# Patient Record
Sex: FEMALE | Race: WHITE | NOT HISPANIC OR LATINO | Employment: UNEMPLOYED | URBAN - METROPOLITAN AREA
[De-identification: names, ages, dates, MRNs, and addresses within clinical notes are randomized per-mention and may not be internally consistent; named-entity substitution may affect disease eponyms.]

---

## 2020-01-21 ENCOUNTER — APPOINTMENT (OUTPATIENT)
Dept: GENERAL RADIOLOGY | Facility: HOSPITAL | Age: 45
End: 2020-01-21

## 2020-01-21 ENCOUNTER — APPOINTMENT (OUTPATIENT)
Dept: CT IMAGING | Facility: HOSPITAL | Age: 45
End: 2020-01-21

## 2020-01-21 ENCOUNTER — HOSPITAL ENCOUNTER (EMERGENCY)
Facility: HOSPITAL | Age: 45
Discharge: HOME | End: 2020-01-21
Attending: EMERGENCY MEDICINE | Admitting: EMERGENCY MEDICINE

## 2020-01-21 VITALS
DIASTOLIC BLOOD PRESSURE: 66 MMHG | OXYGEN SATURATION: 97 % | WEIGHT: 142 LBS | RESPIRATION RATE: 16 BRPM | HEART RATE: 75 BPM | TEMPERATURE: 99.1 F | SYSTOLIC BLOOD PRESSURE: 122 MMHG | HEIGHT: 64 IN | BODY MASS INDEX: 24.24 KG/M2

## 2020-01-21 DIAGNOSIS — R11.2 NAUSEA AND VOMITING, INTRACTABILITY OF VOMITING NOT SPECIFIED, UNSPECIFIED VOMITING TYPE: ICD-10-CM

## 2020-01-21 DIAGNOSIS — F10.930 ALCOHOL WITHDRAWAL SYNDROME WITHOUT COMPLICATION (HCC): Primary | ICD-10-CM

## 2020-01-21 LAB
ALBUMIN SERPL-MCNC: 4.5 G/DL (ref 3.5–5.2)
ALBUMIN/GLOB SERPL: 1.4 G/DL
ALP SERPL-CCNC: 71 U/L (ref 39–117)
ALT SERPL W P-5'-P-CCNC: 12 U/L (ref 1–33)
ANION GAP SERPL CALCULATED.3IONS-SCNC: 13 MMOL/L (ref 5–15)
AST SERPL-CCNC: 16 U/L (ref 1–32)
BASOPHILS # BLD AUTO: 0.06 10*3/MM3 (ref 0–0.2)
BASOPHILS NFR BLD AUTO: 0.5 % (ref 0–1.5)
BILIRUB SERPL-MCNC: 0.7 MG/DL (ref 0.2–1.2)
BUN BLD-MCNC: 14 MG/DL (ref 6–20)
BUN/CREAT SERPL: 17.1 (ref 7–25)
CALCIUM SPEC-SCNC: 9.2 MG/DL (ref 8.6–10.5)
CHLORIDE SERPL-SCNC: 100 MMOL/L (ref 98–107)
CO2 SERPL-SCNC: 25 MMOL/L (ref 22–29)
CREAT BLD-MCNC: 0.82 MG/DL (ref 0.57–1)
DEPRECATED RDW RBC AUTO: 49.1 FL (ref 37–54)
EOSINOPHIL # BLD AUTO: 0.34 10*3/MM3 (ref 0–0.4)
EOSINOPHIL NFR BLD AUTO: 3 % (ref 0.3–6.2)
ERYTHROCYTE [DISTWIDTH] IN BLOOD BY AUTOMATED COUNT: 14.1 % (ref 12.3–15.4)
GFR SERPL CREATININE-BSD FRML MDRD: 75 ML/MIN/1.73
GLOBULIN UR ELPH-MCNC: 3.2 GM/DL
GLUCOSE BLD-MCNC: 86 MG/DL (ref 65–99)
HCT VFR BLD AUTO: 43.1 % (ref 34–46.6)
HGB BLD-MCNC: 14.1 G/DL (ref 12–15.9)
HOLD SPECIMEN: NORMAL
HOLD SPECIMEN: NORMAL
IMM GRANULOCYTES # BLD AUTO: 0.04 10*3/MM3 (ref 0–0.05)
IMM GRANULOCYTES NFR BLD AUTO: 0.3 % (ref 0–0.5)
LIPASE SERPL-CCNC: 20 U/L (ref 13–60)
LYMPHOCYTES # BLD AUTO: 2.51 10*3/MM3 (ref 0.7–3.1)
LYMPHOCYTES NFR BLD AUTO: 21.8 % (ref 19.6–45.3)
MCH RBC QN AUTO: 31.1 PG (ref 26.6–33)
MCHC RBC AUTO-ENTMCNC: 32.7 G/DL (ref 31.5–35.7)
MCV RBC AUTO: 95.1 FL (ref 79–97)
MONOCYTES # BLD AUTO: 0.96 10*3/MM3 (ref 0.1–0.9)
MONOCYTES NFR BLD AUTO: 8.4 % (ref 5–12)
NEUTROPHILS # BLD AUTO: 7.58 10*3/MM3 (ref 1.7–7)
NEUTROPHILS NFR BLD AUTO: 66 % (ref 42.7–76)
NRBC BLD AUTO-RTO: 0 /100 WBC (ref 0–0.2)
NT-PROBNP SERPL-MCNC: 225.5 PG/ML (ref 5–450)
PLATELET # BLD AUTO: 354 10*3/MM3 (ref 140–450)
PMV BLD AUTO: 11.3 FL (ref 6–12)
POTASSIUM BLD-SCNC: 3.7 MMOL/L (ref 3.5–5.2)
PROT SERPL-MCNC: 7.7 G/DL (ref 6–8.5)
RBC # BLD AUTO: 4.53 10*6/MM3 (ref 3.77–5.28)
SODIUM BLD-SCNC: 138 MMOL/L (ref 136–145)
TROPONIN T SERPL-MCNC: <0.01 NG/ML (ref 0–0.03)
WBC NRBC COR # BLD: 11.49 10*3/MM3 (ref 3.4–10.8)
WHOLE BLOOD HOLD SPECIMEN: NORMAL
WHOLE BLOOD HOLD SPECIMEN: NORMAL

## 2020-01-21 PROCEDURE — 96375 TX/PRO/DX INJ NEW DRUG ADDON: CPT

## 2020-01-21 PROCEDURE — 99284 EMERGENCY DEPT VISIT MOD MDM: CPT

## 2020-01-21 PROCEDURE — 25010000002 DIPHENHYDRAMINE PER 50 MG: Performed by: EMERGENCY MEDICINE

## 2020-01-21 PROCEDURE — 83880 ASSAY OF NATRIURETIC PEPTIDE: CPT | Performed by: EMERGENCY MEDICINE

## 2020-01-21 PROCEDURE — 25010000002 LORAZEPAM PER 2 MG: Performed by: EMERGENCY MEDICINE

## 2020-01-21 PROCEDURE — 83690 ASSAY OF LIPASE: CPT | Performed by: EMERGENCY MEDICINE

## 2020-01-21 PROCEDURE — 96365 THER/PROPH/DIAG IV INF INIT: CPT

## 2020-01-21 PROCEDURE — 70450 CT HEAD/BRAIN W/O DYE: CPT

## 2020-01-21 PROCEDURE — 96366 THER/PROPH/DIAG IV INF ADDON: CPT

## 2020-01-21 PROCEDURE — 93005 ELECTROCARDIOGRAM TRACING: CPT | Performed by: EMERGENCY MEDICINE

## 2020-01-21 PROCEDURE — 85025 COMPLETE CBC W/AUTO DIFF WBC: CPT | Performed by: EMERGENCY MEDICINE

## 2020-01-21 PROCEDURE — 25010000002 THIAMINE PER 100 MG: Performed by: PHYSICIAN ASSISTANT

## 2020-01-21 PROCEDURE — 84484 ASSAY OF TROPONIN QUANT: CPT | Performed by: EMERGENCY MEDICINE

## 2020-01-21 PROCEDURE — 25010000002 METOCLOPRAMIDE PER 10 MG: Performed by: EMERGENCY MEDICINE

## 2020-01-21 PROCEDURE — 71045 X-RAY EXAM CHEST 1 VIEW: CPT

## 2020-01-21 PROCEDURE — 25010000002 MAGNESIUM SULFATE PER 500 MG OF MAGNESIUM: Performed by: PHYSICIAN ASSISTANT

## 2020-01-21 PROCEDURE — 80053 COMPREHEN METABOLIC PANEL: CPT | Performed by: EMERGENCY MEDICINE

## 2020-01-21 RX ORDER — CLONIDINE HYDROCHLORIDE 0.1 MG/1
0.1 TABLET ORAL 3 TIMES DAILY
COMMUNITY
End: 2020-02-11 | Stop reason: SDUPTHER

## 2020-01-21 RX ORDER — SUCRALFATE 1 G/1
1 TABLET ORAL 4 TIMES DAILY
COMMUNITY

## 2020-01-21 RX ORDER — DIPHENHYDRAMINE HYDROCHLORIDE 50 MG/ML
25 INJECTION INTRAMUSCULAR; INTRAVENOUS ONCE
Status: COMPLETED | OUTPATIENT
Start: 2020-01-21 | End: 2020-01-21

## 2020-01-21 RX ORDER — LORAZEPAM 2 MG/ML
1 INJECTION INTRAMUSCULAR ONCE
Status: COMPLETED | OUTPATIENT
Start: 2020-01-21 | End: 2020-01-21

## 2020-01-21 RX ORDER — ONDANSETRON 4 MG/1
4 TABLET, ORALLY DISINTEGRATING ORAL EVERY 8 HOURS PRN
Qty: 12 TABLET | Refills: 0 | Status: SHIPPED | OUTPATIENT
Start: 2020-01-21

## 2020-01-21 RX ORDER — METOCLOPRAMIDE HYDROCHLORIDE 5 MG/ML
10 INJECTION INTRAMUSCULAR; INTRAVENOUS ONCE
Status: COMPLETED | OUTPATIENT
Start: 2020-01-21 | End: 2020-01-21

## 2020-01-21 RX ORDER — SODIUM CHLORIDE 0.9 % (FLUSH) 0.9 %
10 SYRINGE (ML) INJECTION AS NEEDED
Status: DISCONTINUED | OUTPATIENT
Start: 2020-01-21 | End: 2020-01-21 | Stop reason: HOSPADM

## 2020-01-21 RX ORDER — QUETIAPINE FUMARATE 100 MG/1
100 TABLET, FILM COATED ORAL NIGHTLY
COMMUNITY

## 2020-01-21 RX ORDER — RANITIDINE 150 MG/1
150 TABLET ORAL 2 TIMES DAILY
COMMUNITY

## 2020-01-21 RX ORDER — ASPIRIN 81 MG/1
324 TABLET, CHEWABLE ORAL ONCE
Status: DISCONTINUED | OUTPATIENT
Start: 2020-01-21 | End: 2020-01-21

## 2020-01-21 RX ADMIN — METOCLOPRAMIDE 10 MG: 5 INJECTION, SOLUTION INTRAMUSCULAR; INTRAVENOUS at 15:54

## 2020-01-21 RX ADMIN — THIAMINE HYDROCHLORIDE 1000 ML/HR: 100 INJECTION, SOLUTION INTRAMUSCULAR; INTRAVENOUS at 16:30

## 2020-01-21 RX ADMIN — LORAZEPAM 1 MG: 2 INJECTION INTRAMUSCULAR; INTRAVENOUS at 15:54

## 2020-01-21 RX ADMIN — DIPHENHYDRAMINE HYDROCHLORIDE 25 MG: 50 INJECTION INTRAMUSCULAR; INTRAVENOUS at 15:54

## 2020-01-21 RX ADMIN — SODIUM CHLORIDE 2000 ML: 9 INJECTION, SOLUTION INTRAVENOUS at 15:53

## 2020-02-10 NOTE — PROGRESS NOTES
"[facility]  Heart and Valve Center      02/11/2020       Jonna Lazar  [address] [facility] [address]  [unfilled]    1975    Yamileth Toledo APRN    Jonna Lazar is a 44 y.o. female.      Subjective:     Chief Complaint:  Hypertension and Establish Care       HPI   Patient is a 44-year-old female with past medical history significant for anxiety, depression, alcohol abuse and hypertension who presents to the heart and valve Center for evaluation of uncontrolled hypertension at the request of LEENA Martini.  Patient sent to ED 1/21 with uncontrolled hypertension and alcohol withdrawal.  Patient had associated nausea, vomiting, chest pain, shortness of breath, left upper extremity weakness, visual disturbances and headaches.  She reports that she normally drinks a pint of vodka daily but had not in the past 5 days.  Work-up in ED was unremarkable.  She reports episodes of \"blacking out\" for a while now but recently has been worsening.  She describes episodes of losing control of her entire body, body shaking and then blacking out.  She recently suffered from a broken rib because of this.  She tells me that over the past week she is cut down her drinking and now only drinks a couple drinks a week.  She notes her left arm going completely limp at times and then associated with her whole body convulsing.  She also notes that her left lower extremity will also go limp at times.  She reports a history of questionable seizures but says she is never seen a neurologist.  She also reports a separate issue of just blacking out and falling to the ground.  She states that this happens when she is sober.  She reports that her blood pressure at home is around 160-170s. She says she has been on clonidine PRN for at least a year and lisinopril also. She says lisinopril/hctz was recently increased.  She was on prazosin but says she ran out of refills on this.  She walks regularly and denies " any exertional shortness of breath or chest pain.     Cardiac risks: HTN, tobacco abuse  Patient Active Problem List   Diagnosis   • Essential hypertension       Past Medical History:   Diagnosis Date   • Migraine    • Seizures (CMS/HCC)        Past Surgical History:   Procedure Laterality Date   • HYSTERECTOMY     • TUBAL ABDOMINAL LIGATION         Family History   Problem Relation Age of Onset   • Parkinsonism Mother    • No Known Problems Father    • No Known Problems Brother    • No Known Problems Maternal Grandmother    • Cancer Maternal Grandfather    • No Known Problems Paternal Grandmother    • No Known Problems Paternal Grandfather    • Cancer Brother         remission       Social History     Socioeconomic History   • Marital status: Single     Spouse name: Not on file   • Number of children: Not on file   • Years of education: Not on file   • Highest education level: Not on file   Tobacco Use   • Smoking status: Current Every Day Smoker     Packs/day: 0.50     Types: Cigarettes   • Smokeless tobacco: Never Used   Substance and Sexual Activity   • Alcohol use: Yes     Frequency: 2-3 times a week     Drinks per session: 5 or 6     Comment: last drank 5 days ago  usually liquor    • Drug use: Never   • Sexual activity: Defer   Social History Narrative    Caffeine: 1 soda daily       No Known Allergies      Current Outpatient Medications:   •  cloNIDine (CATAPRES) 0.1 MG tablet, Take 0.1 mg by mouth 3 (Three) Times a Day., Disp: , Rfl:   •  lisinopril-hydrochlorothiazide (PRINZIDE,ZESTORETIC) 20-12.5 MG per tablet, Take 1 tablet by mouth Daily., Disp: , Rfl:   •  ondansetron ODT (ZOFRAN-ODT) 4 MG disintegrating tablet, Take 1 tablet by mouth Every 8 (Eight) Hours As Needed for Nausea., Disp: 12 tablet, Rfl: 0  •  QUEtiapine (SEROquel) 100 MG tablet, Take 100 mg by mouth Every Night., Disp: , Rfl:   •  raNITIdine (ZANTAC) 150 MG tablet, Take 150 mg by mouth 2 (Two) Times a Day., Disp: , Rfl:   •  sucralfate  (CARAFATE) 1 g tablet, Take 1 g by mouth 4 (Four) Times a Day., Disp: , Rfl:     The following portions of the patient's history were reviewed today and updated as appropriate: allergies, current medications, past family history, past medical history, past social history, past surgical history and problem list     Review of Systems   Constitution: Positive for malaise/fatigue. Negative for chills and fever.   Eyes: Negative.    Cardiovascular: Positive for near-syncope and syncope. Negative for chest pain, claudication, cyanosis, dyspnea on exertion, irregular heartbeat, leg swelling, orthopnea, palpitations and paroxysmal nocturnal dyspnea.   Respiratory: Positive for cough. Negative for shortness of breath and snoring.    Endocrine: Negative.    Hematologic/Lymphatic: Does not bruise/bleed easily.   Skin: Negative for poor wound healing.   Musculoskeletal: Positive for falls, muscle cramps and muscle weakness.   Gastrointestinal: Positive for nausea and vomiting. Negative for abdominal pain, heartburn, hematemesis and melena.   Genitourinary: Negative.  Negative for hematuria.   Neurological: Positive for excessive daytime sleepiness, dizziness, headaches, light-headedness, loss of balance, numbness, paresthesias and weakness.   Psychiatric/Behavioral: Negative.    Allergic/Immunologic: Negative.        Objective:     Vitals:    02/11/20 1332 02/11/20 1333 02/11/20 1334   BP: 129/72 125/67 122/68   BP Location: Left arm Left arm Right arm   Patient Position: Standing Sitting Sitting   Cuff Size: Adult Adult Adult   Pulse: 90 77 81   Temp:   98.4 °F (36.9 °C)   TempSrc:   Temporal   SpO2: 99% 97% 97%   Weight:   67.2 kg (148 lb 4 oz)       Body mass index is 25.45 kg/m².    Physical Exam   Constitutional: She is oriented to person, place, and time. She appears well-developed and well-nourished. No distress.   HENT:   Head: Normocephalic.   Eyes: Pupils are equal, round, and reactive to light. Conjunctivae are  normal.   Neck: Neck supple. No JVD present. No thyromegaly present.   Cardiovascular: Normal rate, regular rhythm, normal heart sounds and intact distal pulses. Exam reveals no gallop and no friction rub.   No murmur heard.  Pulmonary/Chest: Effort normal and breath sounds normal. No respiratory distress. She has no wheezes. She has no rales. She exhibits no tenderness.   Abdominal: Soft. Bowel sounds are normal.   Musculoskeletal: Normal range of motion. She exhibits no edema.   Neurological: She is alert and oriented to person, place, and time.   Skin: Skin is warm and dry.   Psychiatric: She has a normal mood and affect. Her behavior is normal. Thought content normal.   Vitals reviewed.      Lab and Diagnostic Review:  Results for orders placed or performed during the hospital encounter of 01/21/20   Troponin   Result Value Ref Range    Troponin T <0.010 0.000 - 0.030 ng/mL   Comprehensive Metabolic Panel   Result Value Ref Range    Glucose 86 65 - 99 mg/dL    BUN 14 6 - 20 mg/dL    Creatinine 0.82 0.57 - 1.00 mg/dL    Sodium 138 136 - 145 mmol/L    Potassium 3.7 3.5 - 5.2 mmol/L    Chloride 100 98 - 107 mmol/L    CO2 25.0 22.0 - 29.0 mmol/L    Calcium 9.2 8.6 - 10.5 mg/dL    Total Protein 7.7 6.0 - 8.5 g/dL    Albumin 4.50 3.50 - 5.20 g/dL    ALT (SGPT) 12 1 - 33 U/L    AST (SGOT) 16 1 - 32 U/L    Alkaline Phosphatase 71 39 - 117 U/L    Total Bilirubin 0.7 0.2 - 1.2 mg/dL    eGFR Non African Amer 75 >60 mL/min/1.73    Globulin 3.2 gm/dL    A/G Ratio 1.4 g/dL    BUN/Creatinine Ratio 17.1 7.0 - 25.0    Anion Gap 13.0 5.0 - 15.0 mmol/L   Lipase   Result Value Ref Range    Lipase 20 13 - 60 U/L   BNP   Result Value Ref Range    proBNP 225.5 5.0 - 450.0 pg/mL   CBC Auto Differential   Result Value Ref Range    WBC 11.49 (H) 3.40 - 10.80 10*3/mm3    RBC 4.53 3.77 - 5.28 10*6/mm3    Hemoglobin 14.1 12.0 - 15.9 g/dL    Hematocrit 43.1 34.0 - 46.6 %    MCV 95.1 79.0 - 97.0 fL    MCH 31.1 26.6 - 33.0 pg    MCHC 32.7 31.5  - 35.7 g/dL    RDW 14.1 12.3 - 15.4 %    RDW-SD 49.1 37.0 - 54.0 fl    MPV 11.3 6.0 - 12.0 fL    Platelets 354 140 - 450 10*3/mm3    Neutrophil % 66.0 42.7 - 76.0 %    Lymphocyte % 21.8 19.6 - 45.3 %    Monocyte % 8.4 5.0 - 12.0 %    Eosinophil % 3.0 0.3 - 6.2 %    Basophil % 0.5 0.0 - 1.5 %    Immature Grans % 0.3 0.0 - 0.5 %    Neutrophils, Absolute 7.58 (H) 1.70 - 7.00 10*3/mm3    Lymphocytes, Absolute 2.51 0.70 - 3.10 10*3/mm3    Monocytes, Absolute 0.96 (H) 0.10 - 0.90 10*3/mm3    Eosinophils, Absolute 0.34 0.00 - 0.40 10*3/mm3    Basophils, Absolute 0.06 0.00 - 0.20 10*3/mm3    Immature Grans, Absolute 0.04 0.00 - 0.05 10*3/mm3    nRBC 0.0 0.0 - 0.2 /100 WBC     EKG 1/21/20 Normal sinus rhythm  Nonspecific ST and T wave abnormality  Abnormal ECG    Assessment and Plan:   1. Essential hypertension  Appears controlled today. I would suspect this is because she has decreased her ETOH intake and she tells me she is now taking clonidine TID regularly. Discussed difficulty controlling BPs with excessive amount of ETOH and encouraged to quit. Offered 24 hour blood pressure monitor vs home monitoring and she says she will start monitoring regularly at home. Call if SBP consistently greater than 140. Will add amlodipine so she can wean off clonidine and use again PRN. Advised her to stop afternoon dose for about a week then try and wean off morning dose and evening dose later and then use PRN SBP>160   - Mobile Cardiac Outpatient Telemetry; Future  - Adult Transthoracic Echo Complete W/ Cont if Necessary to rule out structural heart disease/alcoholic cardiomyopathy    2. Syncope and collapse  Symptoms sound more neurological. She will discuss neurology referral with Dr. Toledo at her follow up next week. I am inclined to think that episodes of LOC are related to ETOH abuse but she is very adamant that she is sober during the time. Will do event monitor to rule out arrhythmia  - Mobile Cardiac Outpatient Telemetry;  Future  - Adult Transthoracic Echo Complete W/ Cont if Necessary Per Protocol; Future      RV 6 weeks    It has been a pleasure to participate in the care of this patient.  Patient was instructed to call the Heart and Valve Center with any questions, concerns, or worsening symptoms.    *Please note that portions of this note were completed with a voice recognition program. Efforts were made to edit the dictations, but occasionally words are mistranscribed.

## 2020-02-11 ENCOUNTER — HOSPITAL ENCOUNTER (OUTPATIENT)
Dept: CARDIOLOGY | Facility: HOSPITAL | Age: 45
Discharge: HOME | End: 2020-02-11

## 2020-02-11 ENCOUNTER — OFFICE VISIT (OUTPATIENT)
Dept: CARDIOLOGY | Facility: HOSPITAL | Age: 45
End: 2020-02-11

## 2020-02-11 VITALS
BODY MASS INDEX: 25.45 KG/M2 | SYSTOLIC BLOOD PRESSURE: 122 MMHG | OXYGEN SATURATION: 97 % | HEART RATE: 81 BPM | TEMPERATURE: 98.4 F | WEIGHT: 148.25 LBS | DIASTOLIC BLOOD PRESSURE: 68 MMHG

## 2020-02-11 DIAGNOSIS — R55 SYNCOPE AND COLLAPSE: ICD-10-CM

## 2020-02-11 DIAGNOSIS — I10 ESSENTIAL HYPERTENSION: ICD-10-CM

## 2020-02-11 DIAGNOSIS — I10 ESSENTIAL HYPERTENSION: Primary | ICD-10-CM

## 2020-02-11 PROCEDURE — 99204 OFFICE O/P NEW MOD 45 MIN: CPT | Performed by: NURSE PRACTITIONER

## 2020-02-11 PROCEDURE — 93272 ECG/REVIEW INTERPRET ONLY: CPT | Performed by: INTERNAL MEDICINE

## 2020-02-11 RX ORDER — LISINOPRIL AND HYDROCHLOROTHIAZIDE 20; 12.5 MG/1; MG/1
1 TABLET ORAL DAILY
COMMUNITY

## 2020-02-11 RX ORDER — CLONIDINE HYDROCHLORIDE 0.1 MG/1
0.1 TABLET ORAL 3 TIMES DAILY PRN
Start: 2020-02-11 | End: 2020-02-11 | Stop reason: SDUPTHER

## 2020-02-11 RX ORDER — AMLODIPINE BESYLATE 5 MG/1
5 TABLET ORAL DAILY
Qty: 30 TABLET | Refills: 3 | Status: SHIPPED | OUTPATIENT
Start: 2020-02-11

## 2020-02-11 RX ORDER — CLONIDINE HYDROCHLORIDE 0.1 MG/1
0.1 TABLET ORAL 3 TIMES DAILY PRN
Start: 2020-02-11

## 2020-02-11 NOTE — PROGRESS NOTES
[facility] Heart Monitor Documentation    Jonna Lazar  1975  [id number]  02/11/20    LEENA Esposito    [] ZIO XT Patch  Model [id number] Prescribed for N/A Days    · Serial Number: (N + 9 Digits) N   · Apply-By Date on Box:   · USPS Tracking Number:   · USPS Tracking        [x] [employer] BodyGuardian MINI PLUS Mobile Cardiac Telemetry  Model BGMINIPLUS Prescribed for 30 Days    · Serial Number: (BGM + 7 Digits) [id number]  · Shipped-By Date on Box: 02/05/2020  · UPS Tracking Number: [id number]  · UPS Tracking      [] [employer]ian MINI Holter Monitor  Model BGMINIEL Prescribed for N/A Days    · Serial Number: (7 Digits)   · Shipped-By Date on Box:   · UPS Tracking Number: [id number]  · UPS Tracking        This monitor was applied to the patient's chest and checked for proper functioning.  Ms. Jonna Lazar was instructed in the proper use of this monitor.  She was given the opportunity to ask questions and left the office with the device 's instruction manual.    Ritu Blair MA, 3:01 PM, 02/11/20                  [facility] 8.8.[facility]

## 2020-03-10 ENCOUNTER — OFFICE VISIT (OUTPATIENT)
Dept: PULMONOLOGY | Facility: CLINIC | Age: 45
End: 2020-03-10

## 2020-03-10 ENCOUNTER — NURSE NAVIGATOR (OUTPATIENT)
Dept: ONCOLOGY | Facility: CLINIC | Age: 45
End: 2020-03-10

## 2020-03-10 VITALS
WEIGHT: 150 LBS | DIASTOLIC BLOOD PRESSURE: 84 MMHG | HEIGHT: 65 IN | TEMPERATURE: 98.8 F | RESPIRATION RATE: 18 BRPM | SYSTOLIC BLOOD PRESSURE: 116 MMHG | BODY MASS INDEX: 24.99 KG/M2 | OXYGEN SATURATION: 97 % | HEART RATE: 83 BPM

## 2020-03-10 DIAGNOSIS — Q27.30 AVM (ARTERIOVENOUS MALFORMATION): ICD-10-CM

## 2020-03-10 DIAGNOSIS — Z00.6 EXAMINATION FOR NORMAL COMPARISON FOR CLINICAL RESEARCH: Primary | ICD-10-CM

## 2020-03-10 DIAGNOSIS — R91.8 ABNORMAL CT LUNG SCREENING: Primary | ICD-10-CM

## 2020-03-10 DIAGNOSIS — F17.200 SMOKER: ICD-10-CM

## 2020-03-10 DIAGNOSIS — R91.8 ABNORMAL CT SCAN OF LUNG: ICD-10-CM

## 2020-03-10 DIAGNOSIS — J41.0 SIMPLE CHRONIC BRONCHITIS (HCC): ICD-10-CM

## 2020-03-10 DIAGNOSIS — R91.1 PULMONARY NODULE: ICD-10-CM

## 2020-03-10 PROBLEM — J42 CHRONIC BRONCHITIS (HCC): Status: ACTIVE | Noted: 2020-03-10

## 2020-03-10 PROBLEM — F41.1 GENERALIZED ANXIETY DISORDER: Status: ACTIVE | Noted: 2020-03-10

## 2020-03-10 PROBLEM — K29.30 CHRONIC SUPERFICIAL GASTRITIS: Status: ACTIVE | Noted: 2020-03-10

## 2020-03-10 PROCEDURE — 87385 HISTOPLASMA CAPSUL AG IA: CPT | Performed by: INTERNAL MEDICINE

## 2020-03-10 PROCEDURE — 36415 COLL VENOUS BLD VENIPUNCTURE: CPT | Performed by: INTERNAL MEDICINE

## 2020-03-10 PROCEDURE — 86606 ASPERGILLUS ANTIBODY: CPT | Performed by: INTERNAL MEDICINE

## 2020-03-10 PROCEDURE — 86480 TB TEST CELL IMMUN MEASURE: CPT | Performed by: INTERNAL MEDICINE

## 2020-03-10 PROCEDURE — 94726 PLETHYSMOGRAPHY LUNG VOLUMES: CPT | Performed by: INTERNAL MEDICINE

## 2020-03-10 PROCEDURE — 99244 OFF/OP CNSLTJ NEW/EST MOD 40: CPT | Performed by: INTERNAL MEDICINE

## 2020-03-10 PROCEDURE — 86698 HISTOPLASMA ANTIBODY: CPT | Performed by: INTERNAL MEDICINE

## 2020-03-10 PROCEDURE — 94729 DIFFUSING CAPACITY: CPT | Performed by: INTERNAL MEDICINE

## 2020-03-10 PROCEDURE — 86612 BLASTOMYCES ANTIBODY: CPT | Performed by: INTERNAL MEDICINE

## 2020-03-10 PROCEDURE — 87449 NOS EACH ORGANISM AG IA: CPT | Performed by: INTERNAL MEDICINE

## 2020-03-10 PROCEDURE — 94375 RESPIRATORY FLOW VOLUME LOOP: CPT | Performed by: INTERNAL MEDICINE

## 2020-03-10 PROCEDURE — 87899 AGENT NOS ASSAY W/OPTIC: CPT | Performed by: INTERNAL MEDICINE

## 2020-03-10 RX ORDER — CETIRIZINE HYDROCHLORIDE 10 MG/1
10 TABLET ORAL DAILY
COMMUNITY
Start: 2020-01-22

## 2020-03-10 NOTE — PROGRESS NOTES
Sorts Subjective:     Chief Complaint:   Chief Complaint   Patient presents with   • Cough     pet scan results       HPI:    Jonna Lazar is a 44 y.o. female seen in consultation at the request of LEENA Andersen for evaluation of an abnormal CAT scan/pulmonary nodules    She is a rather vague historian but it sounds as if she has had an increased cough for several months and to a lesser degree for several years.  She says she often expectorates mucus and sometimes it is green.  She denies any hemoptysis or chronic chest pain/pleurisy.  She has no history of tuberculosis or histoplasmosis and did have a negative TB skin test about a year ago.    She presented to the emergency room with multiple symptoms including vomiting, high blood pressure, headache, and shortness of breath and had a chest x-ray which revealed an incidental left upper lobe nodule.  Supposedly this was compared to a chest x-ray of 2013 but there is no indication in the note as to whether this area was new or old and I do not have the old images available.    At any rate, a follow-up CAT scan was done at [facility] which revealed a left upper lobe 1.3 cm nodule which was noncalcified but heterogeneous and with a possible associated vessel.  There was also tree-in-bud opacities in the left lower lobe and to lesser degree in the left upper lobe.    A PET scan was performed which revealed the left upper lobe nodule was not FDG avid.  There was some mediastinal uptake which was indeterminant.    She is a longstanding smoker and also has heavy alcohol use.    Further historical details:    General symptoms:  - no fever  - weight stable  - no edema    Exercise tolerance:  - dyspnea with heavy exercise only    Chest symptoms:  - no chest pain  - moderate cough  - cough is productive    Sputum:  - expectorates green sputum  - denies hemoptysis    Upper airway:  - congestion    Reflux symptoms:  - reflux symptoms controlled with H2  blocker    Recent imaging:  - CT of chest: As described above  - PET scan: As described above    Current medications are:   Current Outpatient Medications:   •  amLODIPine (NORVASC) 5 MG tablet, Take 1 tablet by mouth Daily., Disp: 30 tablet, Rfl: 3  •  cetirizine (zyrTEC) 10 MG tablet, , Disp: , Rfl:   •  cloNIDine (CATAPRES) 0.1 MG tablet, Take 1 tablet by mouth 3 (Three) Times a Day As Needed for High Blood Pressure (systolic BP >160)., Disp: , Rfl:   •  lisinopril-hydrochlorothiazide (PRINZIDE,ZESTORETIC) 20-12.5 MG per tablet, Take 1 tablet by mouth Daily., Disp: , Rfl:   •  ondansetron ODT (ZOFRAN-ODT) 4 MG disintegrating tablet, Take 1 tablet by mouth Every 8 (Eight) Hours As Needed for Nausea., Disp: 12 tablet, Rfl: 0  •  QUEtiapine (SEROquel) 100 MG tablet, Take 100 mg by mouth Every Night., Disp: , Rfl:   •  raNITIdine (ZANTAC) 150 MG tablet, Take 150 mg by mouth 2 (Two) Times a Day., Disp: , Rfl:   •  sucralfate (CARAFATE) 1 g tablet, Take 1 g by mouth 4 (Four) Times a Day., Disp: , Rfl: .      The patient's relevant past medical, surgical, family and social history were reviewed and updated in Epic as appropriate.     ROS:    Review of Systems  ROS documented in patient questionnaire ×14 systems.  Reviewed with patient.  Otherwise negative except as noted in HPI.    Objective:    Physical Exam   Constitutional: She is oriented to person, place, and time. She appears well-developed and well-nourished.   HENT:   Head: Normocephalic and atraumatic.   Nose: Nose normal.   Mouth/Throat: Oropharynx is clear and moist. No oropharyngeal exudate.   Eyes: Conjunctivae are normal. No scleral icterus.   Neck: No tracheal deviation present. No thyromegaly present.   Cardiovascular: Normal rate and regular rhythm. Exam reveals no gallop and no friction rub.   No murmur heard.  Pulmonary/Chest: Effort normal. No respiratory distress. She has no wheezes. She has no rales.   Musculoskeletal: She exhibits no edema or  deformity.   Neurological: She is alert and oriented to person, place, and time.   Skin: Skin is warm and dry. No rash noted.   Psychiatric: She has a normal mood and affect. Her behavior is normal.   Nursing note and vitals reviewed.      Diagnostics:     PFT:  - normal spirometry  - normal lung volumes  - moderate decrease in diffusing capacity    CXR:  - no additional    Assessment:    Problem List Items Addressed This Visit        Pulmonary Problems    Pulmonary nodule    Overview     1. Left upper lobe 1.3 cm nodule, noncalcified, with heterogeneity and possible feeding vessel  2. 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 Left upper lobe 1.3 cm nodule, noncalcified, with heterogeneity and possible feeding vessel  4. Left lower lobe and to lesser degree left upper lobe tree-in-bud opacities           Relevant Orders    Blastomyces Antigen - Urine, Urine, Clean Catch    Histoplasma Ag Ur - Urine, Clean Catch    Cryptococcal Antigen    Fungal Antibodies, DID    QuantiFERON TB Gold    Adult Transthoracic Echo Limited W/ Cont if Necessary Per Protocol     CT Angiogram Chest With & Without Contrast    Smoker      Other Visit Diagnoses     Abnormal CT lung screening    -  Primary    Relevant Orders    Pulmonary Function Test (Completed)    Blastomyces Antigen - Urine, Urine, Clean Catch    Histoplasma Ag Ur - Urine, Clean Catch    Cryptococcal Antigen    Fungal Antibodies, DID    QuantiFERON TB Gold    Adult Transthoracic Echo Limited W/ Cont if Necessary Per Protocol    CT Angiogram Chest With & Without Contrast          44-year-old female longstanding smoker and also with fairly heavy chronic alcohol use presents with a longstanding cough and findings on a recent chest x-ray which prompted a chest CT and PET scan.  Basically I think she has 2 separate abnormalities.  First of all, she has a 1.3 cm left upper lobe nodule which is noncalcified but has heterogeneity and possibly a feeding vessel.  Secondly and I feel separately are tree-in-bud opacities of the left lower lobe and to lesser degree in the left upper lobe.  She has relatively normal PFTs except for a decreased diffusing capacity.    In regards to the left upper lobe nodule, I am suspicious of a pulmonary AVM.  This could potentially also be a hamartoma though there are no calcifications in it.  This does not have any uptake by PET scan and therefore I think a malignant or infectious process is unlikely.    In regards to the left lower lobe abnormalities, this is likely infectious or inflammatory and may have some bearing on her chronic cough.    In regards to her chronic cough this could be related to the abnormalities above or she could have chronic bronchitis related to smoking.  She certainly does not have any airway obstruction to warrant a formal diagnosis of COPD by her PFTs.      Plan:     1. I discussed the CT abnormalities with 1 of our radiologist and our plan is to perform a CT angiogram scan with and without contrast with thin cuts through the left upper lobe nodule in order to assess for  any feeding vessels  2. Bubble echo to assess for shunting  3. Bronchoscopy with lavage of the left lower lobe to rule out chronic infection  4. Blood and urinary serologies as noted above  5. Follow-up after the above are completed  6. I have discussed all the above in detail with the patient and her mother and tried to explain her problems as best I could given the available information.  They understand that follow-up is of necessity  .  Level of Risk Moderate due to: undiagnosed new problem    Signed by  Negrito Pittman MD

## 2020-03-11 LAB — CRYPTOC AG CSF QL: NEGATIVE

## 2020-03-12 ENCOUNTER — TRANSCRIBE ORDERS (OUTPATIENT)
Dept: PULMONOLOGY | Facility: CLINIC | Age: 45
End: 2020-03-12

## 2020-03-12 NOTE — PROGRESS NOTES
3/10/2020-I saw patient with Dr. Pittman on this day.  Patient had a incidental 1.3cm left upper lung nodule and a left lower lobe possible infectious process per CT scan.  MD thoroughly discussed patient's CT scan and PET results with patient and her mother.  Patient does have a wet cough and does produce green secretions at times.  Patient does have SOB at times.  Patient denies weight loss and has had a negative TB test in the past.  Dr. Pittman plans to get labs on patient today in the office, order a bubble echo, CT angio of chest, and schedule a bronch with lavage for that possible infectious process noted on CT.  Patient agreed to plans.  I provided patient with Savannah Praker's contact information.  Savannah is the Lung Nurse Navigator and is out of the office today, but will return tomorrow. Patient knows to call Savannah for any navigational needs. AG

## 2020-03-13 LAB
H CAPSUL AG UR-MCNC: <0.5 NG/ML
Lab: NORMAL
QUANTIFERON CRITERIA: NORMAL
QUANTIFERON MITOGEN VALUE: >10 IU/ML
QUANTIFERON NIL VALUE: 0.01 IU/ML
QUANTIFERON TB1 AG VALUE: 0.03 IU/ML
QUANTIFERON TB2 AG VALUE: 0.02 IU/ML
QUANTIFERON-TB GOLD PLUS: NEGATIVE

## 2020-03-15 LAB
A FLAVUS AB SER QL ID: NEGATIVE
A FUMIGATUS AB SER QL ID: NEGATIVE
A NIGER AB SER QL ID: NEGATIVE
B DERMAT AB TITR SER: NEGATIVE {TITER}
H CAPSUL AB TITR SER ID: NEGATIVE {TITER}

## 2020-03-16 LAB
MVISTA(R) BLASTOMYCES AG: NORMAL NG/ML
SPECIMEN SOURCE: NORMAL

## 2020-03-18 ENCOUNTER — TELEPHONE (OUTPATIENT)
Dept: CARDIOLOGY | Facility: HOSPITAL | Age: 45
End: 2020-03-18

## 2020-03-18 ENCOUNTER — DOCUMENTATION (OUTPATIENT)
Dept: PULMONOLOGY | Facility: CLINIC | Age: 45
End: 2020-03-18

## 2020-03-18 NOTE — PROGRESS NOTES
"Multiple attempts to contact patient by CS and office staff without results. \"Voice mailbox is full\", unable to leave VM. She needs to schedule ECHO and CTA.     Will send certified letter with number to CS and office.  Will also remind patient of date and time of her scheduled FU OV with Dr Pittman on 4/8/20 since we are unable to contact her by phone.   "

## 2020-03-19 ENCOUNTER — TELEPHONE (OUTPATIENT)
Dept: PULMONOLOGY | Facility: CLINIC | Age: 45
End: 2020-03-19

## 2020-03-19 ENCOUNTER — TELEPHONE (OUTPATIENT)
Dept: CARDIOLOGY | Facility: HOSPITAL | Age: 45
End: 2020-03-19

## 2020-03-19 NOTE — TELEPHONE ENCOUNTER
Received a message today that the patient was wanting to cancel her bronchoscopy for this afternoon. I called the patient to get more detailed information and explain the process for rescheduling this given the current COVID 19 epidemic. Immediately the patient voiced frustration and was unable to answer my questions, another person on the line attempted to calm the patient but the call was ended. Dr. Pittman has advised that a certified letter has been sent to the patient regarding other testing that needs to be completed and to hold off on rescheduling the bronchoscopy until the patient returns the offices' call.

## 2020-03-19 NOTE — TELEPHONE ENCOUNTER
Again attempted to call patient with monitor results and to discuss cancelling/rescheduling appt due to COVID 19 precautions.  Unable to reach patient.  Mailbox was full.  Will send letter

## 2020-03-23 ENCOUNTER — TELEPHONE (OUTPATIENT)
Dept: CARDIOLOGY | Facility: HOSPITAL | Age: 45
End: 2020-03-23

## 2020-03-23 NOTE — TELEPHONE ENCOUNTER
Patient has appointment tomorrow. Due to COVID19 epidemic, I called patient and she reports that she is doing fine and was going to cancel anyways. Advised her to call and reschedule with any new or worsening symptoms

## 2020-04-02 ENCOUNTER — DOCUMENTATION (OUTPATIENT)
Dept: PULMONOLOGY | Facility: CLINIC | Age: 45
End: 2020-04-02

## 2020-04-02 NOTE — PROGRESS NOTES
Certified letter received and signed for on delivery. CT Chest and ECHO have been scheduled prior to OV on 4/8/20.

## 2020-04-07 ENCOUNTER — HOSPITAL ENCOUNTER (OUTPATIENT)
Dept: CARDIOLOGY | Facility: HOSPITAL | Age: 45
Discharge: HOME | End: 2020-04-07

## 2020-04-07 ENCOUNTER — HOSPITAL ENCOUNTER (OUTPATIENT)
Dept: CT IMAGING | Facility: HOSPITAL | Age: 45
Discharge: HOME | End: 2020-04-07
Admitting: INTERNAL MEDICINE

## 2020-04-07 VITALS — BODY MASS INDEX: 24.99 KG/M2 | WEIGHT: 150 LBS | HEIGHT: 65 IN

## 2020-04-07 DIAGNOSIS — Q27.30 AVM (ARTERIOVENOUS MALFORMATION): ICD-10-CM

## 2020-04-07 DIAGNOSIS — R91.1 PULMONARY NODULE: ICD-10-CM

## 2020-04-07 DIAGNOSIS — R91.8 ABNORMAL CT LUNG SCREENING: ICD-10-CM

## 2020-04-07 DIAGNOSIS — R91.8 ABNORMAL CT SCAN OF LUNG: ICD-10-CM

## 2020-04-07 LAB
BH CV ECHO MEAS - AO MAX PG: 7 MMHG
BH CV ECHO MEAS - AO ROOT AREA (BSA CORRECTED): 1.5
BH CV ECHO MEAS - AO ROOT AREA: 5.7 CM^2
BH CV ECHO MEAS - AO ROOT DIAM: 2.7 CM
BH CV ECHO MEAS - AO V2 MAX: 131 CM/SEC
BH CV ECHO MEAS - BSA(HAYCOCK): 1.8 M^2
BH CV ECHO MEAS - BSA: 1.8 M^2
BH CV ECHO MEAS - BZI_BMI: 25 KILOGRAMS/M^2
BH CV ECHO MEAS - BZI_METRIC_HEIGHT: 165.1 CM
BH CV ECHO MEAS - BZI_METRIC_WEIGHT: 68 KG
BH CV ECHO MEAS - EDV(CUBED): 76.8 ML
BH CV ECHO MEAS - EDV(MOD-SP2): 51 ML
BH CV ECHO MEAS - EDV(MOD-SP4): 68 ML
BH CV ECHO MEAS - EDV(TEICH): 80.8 ML
BH CV ECHO MEAS - EF(CUBED): 54 %
BH CV ECHO MEAS - EF(MOD-BP): 55 %
BH CV ECHO MEAS - EF(MOD-SP2): 49 %
BH CV ECHO MEAS - EF(MOD-SP4): 60.3 %
BH CV ECHO MEAS - EF(TEICH): 46.2 %
BH CV ECHO MEAS - ESV(CUBED): 35.3 ML
BH CV ECHO MEAS - ESV(MOD-SP2): 26 ML
BH CV ECHO MEAS - ESV(MOD-SP4): 27 ML
BH CV ECHO MEAS - ESV(TEICH): 43.5 ML
BH CV ECHO MEAS - FS: 22.8 %
BH CV ECHO MEAS - IVS/LVPW: 0.99
BH CV ECHO MEAS - IVSD: 0.73 CM
BH CV ECHO MEAS - LA DIMENSION: 2.6 CM
BH CV ECHO MEAS - LA/AO: 0.96
BH CV ECHO MEAS - LAD MAJOR: 4.9 CM
BH CV ECHO MEAS - LAT PEAK E' VEL: 12.4 CM/SEC
BH CV ECHO MEAS - LATERAL E/E' RATIO: 5.3
BH CV ECHO MEAS - LV DIASTOLIC VOL/BSA (35-75): 38.8 ML/M^2
BH CV ECHO MEAS - LV MASS(C)D: 91.7 GRAMS
BH CV ECHO MEAS - LV MASS(C)DI: 52.4 GRAMS/M^2
BH CV ECHO MEAS - LV SYSTOLIC VOL/BSA (12-30): 15.4 ML/M^2
BH CV ECHO MEAS - LVIDD: 4.3 CM
BH CV ECHO MEAS - LVIDS: 3.3 CM
BH CV ECHO MEAS - LVLD AP2: 6.8 CM
BH CV ECHO MEAS - LVLD AP4: 7.1 CM
BH CV ECHO MEAS - LVLS AP2: 6.2 CM
BH CV ECHO MEAS - LVLS AP4: 6.3 CM
BH CV ECHO MEAS - LVOT AREA (M): 3.1 CM^2
BH CV ECHO MEAS - LVOT AREA: 3.1 CM^2
BH CV ECHO MEAS - LVOT DIAM: 2 CM
BH CV ECHO MEAS - LVPWD: 0.74 CM
BH CV ECHO MEAS - MED PEAK E' VEL: 7.5 CM/SEC
BH CV ECHO MEAS - MEDIAL E/E' RATIO: 8.9
BH CV ECHO MEAS - MV A MAX VEL: 93.1 CM/SEC
BH CV ECHO MEAS - MV DEC TIME: 0.17 SEC
BH CV ECHO MEAS - MV E MAX VEL: 66.2 CM/SEC
BH CV ECHO MEAS - MV E/A: 0.71
BH CV ECHO MEAS - SI(CUBED): 23.7 ML/M^2
BH CV ECHO MEAS - SI(MOD-SP2): 14.3 ML/M^2
BH CV ECHO MEAS - SI(MOD-SP4): 23.4 ML/M^2
BH CV ECHO MEAS - SI(TEICH): 21.3 ML/M^2
BH CV ECHO MEAS - SV(CUBED): 41.5 ML
BH CV ECHO MEAS - SV(MOD-SP2): 25 ML
BH CV ECHO MEAS - SV(MOD-SP4): 41 ML
BH CV ECHO MEAS - SV(TEICH): 37.3 ML
BH CV ECHO MEASUREMENTS AVERAGE E/E' RATIO: 6.65
BH CV VAS BP LEFT ARM: NORMAL MMHG
MAXIMAL PREDICTED HEART RATE: 175 BPM
STRESS TARGET HR: 149 BPM

## 2020-04-07 PROCEDURE — 71275 CT ANGIOGRAPHY CHEST: CPT

## 2020-04-07 PROCEDURE — 93321 DOPPLER ECHO F-UP/LMTD STD: CPT

## 2020-04-07 PROCEDURE — 93325 DOPPLER ECHO COLOR FLOW MAPG: CPT

## 2020-04-07 PROCEDURE — 93306 TTE W/DOPPLER COMPLETE: CPT | Performed by: INTERNAL MEDICINE

## 2020-04-07 PROCEDURE — 25010000002 IOPAMIDOL 61 % SOLUTION: Performed by: INTERNAL MEDICINE

## 2020-04-07 PROCEDURE — 93308 TTE F-UP OR LMTD: CPT

## 2020-04-07 RX ADMIN — IOPAMIDOL 99 ML: 612 INJECTION, SOLUTION INTRAVENOUS at 13:10

## 2020-04-09 ENCOUNTER — TELEMEDICINE (OUTPATIENT)
Dept: PULMONOLOGY | Facility: CLINIC | Age: 45
End: 2020-04-09

## 2020-04-09 DIAGNOSIS — F17.200 SMOKER: ICD-10-CM

## 2020-04-09 DIAGNOSIS — R91.8 ABNORMAL CT SCAN OF LUNG: ICD-10-CM

## 2020-04-09 DIAGNOSIS — J41.0 SIMPLE CHRONIC BRONCHITIS (HCC): Primary | ICD-10-CM

## 2020-04-09 DIAGNOSIS — Q27.30 AVM (ARTERIOVENOUS MALFORMATION): ICD-10-CM

## 2020-04-09 PROCEDURE — 99214 OFFICE O/P EST MOD 30 MIN: CPT | Performed by: NURSE PRACTITIONER

## 2020-04-09 RX ORDER — BUDESONIDE AND FORMOTEROL FUMARATE DIHYDRATE 160; 4.5 UG/1; UG/1
2 AEROSOL RESPIRATORY (INHALATION) 2 TIMES DAILY
Qty: 1 INHALER | Refills: 11 | Status: SHIPPED | OUTPATIENT
Start: 2020-04-09 | End: 2021-01-13

## 2020-04-09 NOTE — PROGRESS NOTES
[facility] Pulmonary Video Visit    CHIEF COMPLAINT    Follow up on testing, cough, congestion, tobacco use    Consent for Video Visit was obtained via [employer]  She was unable to connect to the video visit, therefore I did call her on the telephone to complete the visit.    Subjective   HISTORY OF PRESENT ILLNESS    Jonna Lazar is a 44 y.o.female was seen via Video Visit today for follow-up on her testing.  She saw Dr. Pittman earlier this week for an abnormal CT scan found incidentally due to an episode of a chronic cough that is been persistent for the last year.    She does have this chronic cough with sputum production and chronic mucus production that is been going on for about a year.  She does continue to smoke.  She is not really tried any inhalers or medications to help the cough.    She came into the nodule clinic with his abnormal CT, she had a 1.3 cm nodule that was noncalcified in the left upper lobe.  On review of the scan Dr. Pittman thought it possibly was an AVM therefore she underwent a CT angiogram which was done on 10 March.  She did have a left upper lobe 1.6 cm unchanged nodule that was consistent with a pulmonary AVM.  With a questionable secondary 1 cm pulmonary AVM more anterior in the left upper lobe.    She had a echocardiogram with a bubble study with no evidence of PFO or shunting.  She did have a decreased DLCO on her PFTs but is a smoker.  No evidence of hypoxemia on her last visit.    She has no hemoptysis.    She also had urine and serum fungal antigens which were all negative.  We reviewed those in epic today.        Patient Active Problem List   Diagnosis   • Essential hypertension   • Generalized anxiety disorder   • Chronic superficial gastritis   • Pulmonary nodule   • AVM (arteriovenous malformation) - possible pulmonary GEORGE   • Abnormal CT scan of lung   • Smoker   • Chronic bronchitis (CMS/HCC)       No Known Allergies    Current Outpatient Medications:   •  amLODIPine  (NORVASC) 5 MG tablet, Take 1 tablet by mouth Daily., Disp: 30 tablet, Rfl: 3  •  budesonide-formoterol (SYMBICORT) 160-4.5 MCG/ACT inhaler, Inhale 2 puffs 2 (Two) Times a Day., Disp: 1 inhaler, Rfl: 11  •  cetirizine (zyrTEC) 10 MG tablet, , Disp: , Rfl:   •  cloNIDine (CATAPRES) 0.1 MG tablet, Take 1 tablet by mouth 3 (Three) Times a Day As Needed for High Blood Pressure (systolic BP >160)., Disp: , Rfl:   •  lisinopril-hydrochlorothiazide (PRINZIDE,ZESTORETIC) 20-12.5 MG per tablet, Take 1 tablet by mouth Daily., Disp: , Rfl:   •  ondansetron ODT (ZOFRAN-ODT) 4 MG disintegrating tablet, Take 1 tablet by mouth Every 8 (Eight) Hours As Needed for Nausea., Disp: 12 tablet, Rfl: 0  •  QUEtiapine (SEROquel) 100 MG tablet, Take 100 mg by mouth Every Night., Disp: , Rfl:   •  raNITIdine (ZANTAC) 150 MG tablet, Take 150 mg by mouth 2 (Two) Times a Day., Disp: , Rfl:   •  sucralfate (CARAFATE) 1 g tablet, Take 1 g by mouth 4 (Four) Times a Day., Disp: , Rfl:   MEDICATION LIST AND ALLERGIES REVIEWED.    Social History     Tobacco Use   • Smoking status: Current Every Day Smoker     Packs/day: 0.50     Types: Cigarettes   • Smokeless tobacco: Never Used   Substance Use Topics   • Alcohol use: Yes     Frequency: 2-3 times a week     Drinks per session: 5 or 6     Comment: last drank 5 days ago  usually liquor    • Drug use: Never       FAMILY AND SOCIAL HISTORY REVIEWED.    Review of Systems   Constitutional: Positive for activity change. Negative for chills, fatigue, fever and unexpected weight change.   HENT: Positive for congestion. Negative for nosebleeds, postnasal drip, rhinorrhea, sinus pressure and trouble swallowing.    Respiratory: Positive for cough. Negative for chest tightness, shortness of breath and wheezing.    Cardiovascular: Negative for chest pain and leg swelling.   Gastrointestinal: Negative for abdominal pain, constipation, diarrhea, nausea and vomiting.   Genitourinary: Negative for dysuria, frequency,  hematuria and urgency.   Musculoskeletal: Negative for myalgias.   Neurological: Negative for dizziness, weakness, numbness and headaches.   All other systems reviewed and are negative.  .  Objective         There is no immunization history on file for this patient.      Physical exam unable to be completed secondary to nature visit      RESULTS    EXAMINATION: CT ANGIOGRAM CHEST W WO CONTRAST- 04/07/2020     INDICATION: Pulmonary AVM suspected      TECHNIQUE: CT angiogram chest with and without intravenous contrast; 2-D  and 3-D reconstructions performed.     The radiation dose reduction device was turned on for each scan per the  ALARA (As Low as Reasonably Achievable) protocol.     COMPARISON: CT outside dated 02/18/2020     FINDINGS: Thyroid is homogeneous in attenuation. No bulky mediastinal  adenopathy. Central airways are patent. Esophagus in normal course and  caliber. Patent nonaneurysmal thoracic aorta with patent great vessel  origins. Central pulmonary arteries are grossly patent. Early venous  phase imaging demonstrates draining vein of likely pulmonary AVM left  upper lobe measuring 1.6 cm unchanged from prior in overall appearance  with draining vein seen on series 6 image 37 and 38 for example. A  separate site of likely smaller but vascular origin pulmonary AVM noted  just left of midline anterior left upper lobe measuring 1 cm  additionally noted seen series 6 image 40. No dominant nodule or mass is  otherwise identified. No effusion. No anomalous pulmonary venous return  clearly evident. Descending thoracic aorta unremarkable. Visualized  portions of the upper abdomen demonstrate right adrenal fat density 4 cm  myelolipoma.     IMPRESSION:  1. There are 2 separate areas within the left upper lobe with a dominant  focus 1.6 cm unchanged from 02/18/2020 comparison examination with  direct connection of early venous draining concerning for pulmonary AVM  with a separate smaller but similar-appearing  focus of vascular  involvement and serpiginous venous connection within the more anterior  and near midline left upper lobe 1 cm maximum dimension concerning for  additional smaller pulmonary AVM.     2. Right adrenal myelolipoma.     D:  04/07/2020  E:  04/07/2020     This report was finalized on 4/7/2020 2:16 PM by Dr. Nick Crane.      Interpretation Summary     · Left ventricular systolic function is normal. Calculated EF = 55.0%  · Normal right ventricular cavity size, wall thickness and systolic function noted  · Trace tricuspid valve regurgitation is present. Insufficient TR velocity profile to estimate the right ventricular systolic pressure.  · No evidence of a patent foramen ovale. No evidence of an atrial septal defect present. . Saline test results are negative.         Assessment/Plan     PROBLEM LIST    Problem List Items Addressed This Visit        Cardiovascular and Mediastinum    AVM (arteriovenous malformation) - possible pulmonary GEORGE       Respiratory    Chronic bronchitis (CMS/HCC) - Primary    Relevant Medications    budesonide-formoterol (SYMBICORT) 160-4.5 MCG/ACT inhaler       Other    Abnormal CT scan of lung    Overview     1. Left upper lobe 1.3 cm nodule, noncalcified, with heterogeneity and possible feeding vessel  2. Left lower lobe and to lesser degree left upper lobe tree-in-bud opacities           Smoker            DISCUSSION    We did discuss at this point we are dealing with 2 issues.  The AVM and the chronic bronchitis.    We discussed her chronic bronchitis is likely related to her tobacco use.  She had no significant obstruction or restriction on her PFTs.  She could likely benefit from a combination LAMA/LABA but we did discuss the importance of tobacco cessation.  She did not appear interested in stopping smoking at this time.      For the AVM I did discuss with Dr. Pittman we will follow-up with her in regards to management of her AVM on her next follow-up in 6 to 8  weeks.  At this time it appears to be asymptomatic with a negative shunt on her bubble study and no evidence of hypoxemia.  I will have her do a 6-minute walk test when she sees me next.    Follow up in 6 weeks with full PFTs and a 6 minute walk test.         I spent 25 minutes face to face with the patient and family. I spent > 50% percent of this time counseling and discussing diagnostic testing, evaluation, current status, treatment options and management.    LEENA Rush  04/09/202011:46 AM  Electronically signed     Please note that portions of this note were completed with a voice recognition program. Efforts were made to edit the dictations, but occasionally words are mistranscribed.      CC: Yamileth Toledo, LEENA

## 2020-04-13 ENCOUNTER — DOCUMENTATION (OUTPATIENT)
Dept: PULMONOLOGY | Facility: CLINIC | Age: 45
End: 2020-04-13

## 2020-04-13 NOTE — PROGRESS NOTES
Reviewed recent CT imaging with Dr. Swenson of interventional radiology.  He thought the feeding vessel was about 3.5 mm in diameter so borderline for intervention.  She has no evidence of a significant amount of cardiac shunting by bubble echo.    Plans are to discuss the pros and cons of any intervention with her on her follow-up visit.  The procedural risks would have to be balanced against the risks long-term of embolization.  Any worsening hypoxemia, shortness of breath, or hemoptysis in the future would be concerning.  At the very least she needs yearly follow-up, follow-up CAT scans roughly every 3 years, and antibiotics with any dental or GI procedure.    Negrito Pittman MD

## 2020-05-01 ENCOUNTER — TRANSCRIBE ORDERS (OUTPATIENT)
Dept: ADMINISTRATIVE | Facility: HOSPITAL | Age: 45
End: 2020-05-01

## 2020-05-01 DIAGNOSIS — R55 BLACKOUT: ICD-10-CM

## 2020-05-01 DIAGNOSIS — F10.99 ALCOHOL-RELATED DISORDER (HCC): Primary | ICD-10-CM

## 2020-05-07 ENCOUNTER — APPOINTMENT (OUTPATIENT)
Dept: NEUROLOGY | Facility: HOSPITAL | Age: 45
End: 2020-05-07

## 2020-05-07 ENCOUNTER — HOSPITAL ENCOUNTER (OUTPATIENT)
Dept: NEUROLOGY | Facility: HOSPITAL | Age: 45
Discharge: HOME | End: 2020-05-07
Admitting: NURSE PRACTITIONER

## 2020-05-07 DIAGNOSIS — F10.99 ALCOHOL-RELATED DISORDER (HCC): ICD-10-CM

## 2020-05-07 DIAGNOSIS — R55 BLACKOUT: ICD-10-CM

## 2020-05-07 PROCEDURE — 95816 EEG AWAKE AND DROWSY: CPT

## 2020-05-14 ENCOUNTER — TRANSCRIBE ORDERS (OUTPATIENT)
Dept: ADMINISTRATIVE | Facility: HOSPITAL | Age: 45
End: 2020-05-14

## 2020-05-14 DIAGNOSIS — F10.99 ALCOHOL-RELATED DISORDER (HCC): ICD-10-CM

## 2020-05-14 DIAGNOSIS — R55 BLACKOUT: Primary | ICD-10-CM

## 2020-05-14 DIAGNOSIS — R27.0 ATAXIA: ICD-10-CM

## 2020-05-14 DIAGNOSIS — M62.81 MUSCLE WEAKNESS (GENERALIZED): ICD-10-CM

## 2020-05-21 ENCOUNTER — HOSPITAL ENCOUNTER (OUTPATIENT)
Dept: MRI IMAGING | Facility: HOSPITAL | Age: 45
Discharge: HOME | End: 2020-05-21
Admitting: NURSE PRACTITIONER

## 2020-05-21 DIAGNOSIS — R55 BLACKOUT: ICD-10-CM

## 2020-05-21 PROCEDURE — A9577 INJ MULTIHANCE: HCPCS | Performed by: NURSE PRACTITIONER

## 2020-05-21 PROCEDURE — 70553 MRI BRAIN STEM W/O & W/DYE: CPT

## 2020-05-21 PROCEDURE — 0 GADOBENATE DIMEGLUMINE 529 MG/ML SOLUTION: Performed by: NURSE PRACTITIONER

## 2020-05-21 RX ADMIN — GADOBENATE DIMEGLUMINE 15 ML: 529 INJECTION, SOLUTION INTRAVENOUS at 10:34

## 2021-01-13 ENCOUNTER — OFFICE VISIT (OUTPATIENT)
Dept: PULMONOLOGY | Facility: CLINIC | Age: 46
End: 2021-01-13

## 2021-01-13 VITALS
HEIGHT: 65 IN | BODY MASS INDEX: 25.66 KG/M2 | HEART RATE: 79 BPM | DIASTOLIC BLOOD PRESSURE: 90 MMHG | TEMPERATURE: 98.1 F | RESPIRATION RATE: 16 BRPM | SYSTOLIC BLOOD PRESSURE: 112 MMHG | OXYGEN SATURATION: 98 % | WEIGHT: 154 LBS

## 2021-01-13 DIAGNOSIS — F17.200 SMOKER: ICD-10-CM

## 2021-01-13 DIAGNOSIS — R91.8 ABNORMAL CT SCAN OF LUNG: Primary | ICD-10-CM

## 2021-01-13 DIAGNOSIS — J41.0 SIMPLE CHRONIC BRONCHITIS (HCC): ICD-10-CM

## 2021-01-13 DIAGNOSIS — Q27.30 AVM (ARTERIOVENOUS MALFORMATION): ICD-10-CM

## 2021-01-13 PROCEDURE — 99214 OFFICE O/P EST MOD 30 MIN: CPT | Performed by: INTERNAL MEDICINE

## 2021-01-13 RX ORDER — ONDANSETRON HYDROCHLORIDE 8 MG/1
8 TABLET, FILM COATED ORAL
COMMUNITY
Start: 2021-01-12

## 2021-01-13 RX ORDER — AMOXICILLIN 500 MG/1
2000 TABLET, FILM COATED ORAL ONCE
Qty: 4 TABLET | Refills: 5 | Status: SHIPPED | OUTPATIENT
Start: 2021-01-13 | End: 2021-01-13

## 2021-01-13 RX ORDER — PRAZOSIN HYDROCHLORIDE 1 MG/1
1 CAPSULE ORAL NIGHTLY
COMMUNITY
Start: 2021-01-12

## 2021-01-13 RX ORDER — OMEPRAZOLE 20 MG/1
20 CAPSULE, DELAYED RELEASE ORAL
COMMUNITY
Start: 2021-01-12

## 2021-01-13 RX ORDER — QUETIAPINE FUMARATE 50 MG/1
50 TABLET, FILM COATED ORAL NIGHTLY
COMMUNITY
Start: 2021-01-12

## 2021-01-13 NOTE — PROGRESS NOTES
Subjective:     Chief Complaint:   Chief Complaint   Patient presents with   • Pre-op Exam     f/u       HPI:    Jonna Lazar is a 45 y.o. female here for follow-up of pulmonary AVMs and chronic bronchitis    I saw her in initial consultation in March 2020.  He was referred for pulmonary nodules.  She had symptoms of chronic bronchitis at the time with chronic sputum production which was sometimes green.  She was seen in the emergency room prior to that with shortness of breath and had a CAT scan which revealed an incidental left upper lobe nodule.  A CAT scan performed later at [facility] revealed a left upper lobe 1.3 cm nodule.  A PET scan revealed no avidity.  She is a longstanding history of smoking.    When I saw her initially I thought that there may have been a feeding vessel into the nodule and this might be an AVM.  She therefore underwent CT angiogram which revealed possibly 2 AVMs in the left upper lobe.  I reviewed the images with Dr. Swenson and he thought the bleeding vessel was 3.5 mm in diameter and was very borderline for intervention.  There is no evidence of significant amount of cardiac shunting by bubble echo.    She has much the same type of symptoms.  She has chronic sputum production which is sometimes green.  She denies any hemoptysis.  Her level of dyspnea on exertion is about the same.    Current medications are:   Current Outpatient Medications:   •  amLODIPine (NORVASC) 5 MG tablet, Take 1 tablet by mouth Daily., Disp: 30 tablet, Rfl: 3  •  cetirizine (zyrTEC) 10 MG tablet, Take 10 mg by mouth Daily., Disp: , Rfl:   •  cloNIDine (CATAPRES) 0.1 MG tablet, Take 1 tablet by mouth 3 (Three) Times a Day As Needed for High Blood Pressure (systolic BP >160)., Disp: , Rfl:   •  lisinopril-hydrochlorothiazide (PRINZIDE,ZESTORETIC) 20-12.5 MG per tablet, Take 1 tablet by mouth Daily., Disp: , Rfl:   •  omeprazole (priLOSEC) 20 MG capsule, Take 20 mg by mouth., Disp: , Rfl:   •   ondansetron (ZOFRAN) 8 MG tablet, Take 8 mg by mouth., Disp: , Rfl:   •  ondansetron ODT (ZOFRAN-ODT) 4 MG disintegrating tablet, Take 1 tablet by mouth Every 8 (Eight) Hours As Needed for Nausea., Disp: 12 tablet, Rfl: 0  •  prazosin (MINIPRESS) 1 MG capsule, Take 1 mg by mouth Every Night., Disp: , Rfl:   •  QUEtiapine (SEROquel) 100 MG tablet, Take 100 mg by mouth Every Night., Disp: , Rfl:   •  QUEtiapine (SEROquel) 50 MG tablet, Take 50 mg by mouth Every Night., Disp: , Rfl:   •  raNITIdine (ZANTAC) 150 MG tablet, Take 150 mg by mouth 2 (Two) Times a Day., Disp: , Rfl:   •  sucralfate (CARAFATE) 1 g tablet, Take 1 g by mouth 4 (Four) Times a Day., Disp: , Rfl:   •  amoxicillin (AMOXIL) 500 MG tablet, Take 4 tablets by mouth 1 (One) Time for 1 dose. Take one hour prior to dental procedures, Disp: 4 tablet, Rfl: 5.      The patient's relevant past medical, surgical, family and social history were reviewed and updated in Epic as appropriate.     ROS:    Review of Systems  ROS as documented in patient questionnaire unless as noted otherwise    Objective:    Physical Exam  Vitals signs reviewed.   Constitutional:       Appearance: She is well-developed.   HENT:      Head: Normocephalic and atraumatic.      Mouth/Throat:      Mouth: Mucous membranes are moist.      Pharynx: Oropharynx is clear.   Neck:      Musculoskeletal: Neck supple.      Thyroid: No thyromegaly.   Cardiovascular:      Rate and Rhythm: Normal rate and regular rhythm.      Heart sounds: No murmur. No friction rub. No gallop.    Pulmonary:      Effort: Pulmonary effort is normal. No respiratory distress.      Breath sounds: No wheezing or rales.   Skin:     General: Skin is warm and dry.   Neurological:      Mental Status: She is alert and oriented to person, place, and time.   Psychiatric:         Behavior: Behavior normal.         Thought Content: Thought content normal.         Diagnostics:    CXR: No additional    PFT: No  additional    Assessment:    Problem List Items Addressed This Visit        Pulmonary Problems    Chronic bronchitis (CMS/HCC)       Other    AVM (arteriovenous malformation) - pulmonary GEORGE    Abnormal CT scan of lung - Primary    Overview     1. Left upper lobe 1.3 cm nodule, noncalcified, with heterogeneity and 3.5 mm feeding vessel.  2. Smaller 1.0 cm AVM more anteriorly           Smoker            1. Left upper lobe AVM x2: In discussion with interventional radiology last year it was felt the feeding vessel was quite small and borderline for any intervention.  As the patient had a negative bubble study, no hypoxemia, and no significant hemoptysis I discussed this with her and her plans are to monitor this conservatively with a CAT scan roughly every 3 years.  If she has new symptoms in the interim we can reassess  2. Chronic bronchitis: Normal PFTs at the time of her original visit other than for some decreased diffusion.  She has tried Symbicort without improvement and I do not think this will be beneficial for her over the long-term so she will discontinue it.  3. Tobacco use: Discussed smoking cessation with her today    Preoperative clearance: She tells me that she is planning on having knee surgery at  and they would like preoperative pulmonary clearance.  See no reason she should have significant increase complications based upon her diagnosis of chronic bronchitis and a small AVM.  I did discussed with her that she will need 2 g of amoxicillin 30-60 minutes prior to any dental procedures and I prescribed this for her but she will not need this for sterile procedures such as orthopedic surgery.    Plan:    1. Work on smoking cessation  2. No need for regular inhaled therapy  3. CT scanning every several years to reassess AVMs  4. Amoxicillin 2 g prior to dental procedures as described above.  5. I will make her a visit in 1 year and she will call earlier for any new symptoms    Level of Risk Moderate  due to: two stable chronic illnesses     Level of service justified based on 31 minutes spent in patient care on this date of service including, but not limited to: preparing to see the patient, obtaining and/or reviewing history, performing medically appropriate examination, ordering tests/medicine/procedures, independently interpreting results, documenting clinical information in EHR, and counseling/education of patient/family/caregiver. (Level 4 30-39 minutes; Level 5 40-54 minutes)    Discussed in detail with the patient.  She will call prior to her follow up visit for any new problems.    Signed by  Negrito Pittman MD

## 2021-01-20 ENCOUNTER — TELEPHONE (OUTPATIENT)
Dept: PULMONOLOGY | Facility: CLINIC | Age: 46
End: 2021-01-20

## 2021-06-08 ENCOUNTER — TRANSCRIBE ORDERS (OUTPATIENT)
Dept: LAB | Facility: HOSPITAL | Age: 46
End: 2021-06-08

## 2021-06-08 ENCOUNTER — LAB (OUTPATIENT)
Dept: LAB | Facility: HOSPITAL | Age: 46
End: 2021-06-08

## 2021-06-08 DIAGNOSIS — I15.2 ADRENAL HYPERTENSION (HCC): Primary | ICD-10-CM

## 2021-06-08 DIAGNOSIS — E27.9 ADRENAL HYPERTENSION (HCC): ICD-10-CM

## 2021-06-08 DIAGNOSIS — I15.2 ADRENAL HYPERTENSION (HCC): ICD-10-CM

## 2021-06-08 DIAGNOSIS — E27.9 ADRENAL HYPERTENSION (HCC): Primary | ICD-10-CM

## 2021-06-08 LAB
ALBUMIN SERPL-MCNC: 4.4 G/DL (ref 3.5–5.2)
ANION GAP SERPL CALCULATED.3IONS-SCNC: 15.2 MMOL/L (ref 5–15)
BACTERIA UR QL AUTO: ABNORMAL /HPF
BILIRUB UR QL STRIP: NEGATIVE
BUN SERPL-MCNC: 25 MG/DL (ref 6–20)
BUN/CREAT SERPL: 13.7 (ref 7–25)
CALCIUM SPEC-SCNC: 8.9 MG/DL (ref 8.6–10.5)
CHLORIDE SERPL-SCNC: 100 MMOL/L (ref 98–107)
CLARITY UR: ABNORMAL
CO2 SERPL-SCNC: 21.8 MMOL/L (ref 22–29)
COLOR UR: YELLOW
CORTIS AM PEAK SERPL-MCNC: 5.76 MCG/DL
CREAT SERPL-MCNC: 1.82 MG/DL (ref 0.57–1)
GFR SERPL CREATININE-BSD FRML MDRD: 30 ML/MIN/1.73
GLUCOSE SERPL-MCNC: 80 MG/DL (ref 65–99)
GLUCOSE UR STRIP-MCNC: NEGATIVE MG/DL
HGB UR QL STRIP.AUTO: NEGATIVE
HYALINE CASTS UR QL AUTO: ABNORMAL /LPF
KETONES UR QL STRIP: ABNORMAL
LEUKOCYTE ESTERASE UR QL STRIP.AUTO: NEGATIVE
NITRITE UR QL STRIP: NEGATIVE
PH UR STRIP.AUTO: 5.5 [PH] (ref 5–8)
PHOSPHATE SERPL-MCNC: 5.4 MG/DL (ref 2.5–4.5)
POTASSIUM SERPL-SCNC: 4.1 MMOL/L (ref 3.5–5.2)
PROT UR QL STRIP: NEGATIVE
RBC # UR: ABNORMAL /HPF
REF LAB TEST METHOD: ABNORMAL
SODIUM SERPL-SCNC: 137 MMOL/L (ref 136–145)
SP GR UR STRIP: 1.02 (ref 1–1.03)
SQUAMOUS #/AREA URNS HPF: ABNORMAL /HPF
UROBILINOGEN UR QL STRIP: ABNORMAL
WBC UR QL AUTO: ABNORMAL /HPF

## 2021-06-08 PROCEDURE — 36415 COLL VENOUS BLD VENIPUNCTURE: CPT

## 2021-06-08 PROCEDURE — 87088 URINE BACTERIA CULTURE: CPT

## 2021-06-08 PROCEDURE — 80069 RENAL FUNCTION PANEL: CPT

## 2021-06-08 PROCEDURE — 82384 ASSAY THREE CATECHOLAMINES: CPT

## 2021-06-08 PROCEDURE — 81001 URINALYSIS AUTO W/SCOPE: CPT

## 2021-06-08 PROCEDURE — 87086 URINE CULTURE/COLONY COUNT: CPT

## 2021-06-08 PROCEDURE — 82533 TOTAL CORTISOL: CPT

## 2021-06-08 PROCEDURE — 87186 SC STD MICRODIL/AGAR DIL: CPT

## 2021-06-08 PROCEDURE — 82088 ASSAY OF ALDOSTERONE: CPT

## 2021-06-08 NOTE — ED PROVIDER NOTES
Subjective   Jonna Lazar is a 44 y.o. female who presents to the ED with c/o nausea and vomiting. 2 weeks ago the patient began experiencing nausea and vomiting, reporting approximately 10-15 episodes of vomiting daily initially. Her vomiting has decreased in frequency and she states she vomited 4 times before 1200 today. The patient reports a severe headache behind her eyes and in her temporal regions, the most severe area being behind her right eye, around 1 week ago. She has had migraines in the past but this current headache is not similar to those episodes. The patient saw her primary care provider, Dr. Shannan Toledo, today for her symptoms but she was hypertensive in the office, prompting Dr. Toledo's office to advise her to present to the ED for further evaluation. She took 324 mg of Aspirin and a dosage of Clonidine before her arrival to the ED. The patient complains of photophobia, chest pain, shortness of breath, left upper extremity weakness, visual disturbance of seeing spots in the outskirts of her visual fields, numbness on the left side of her mouth, and mild abdominal pain in the ED but denies abnormally colored stool. She reports that she normally drinks a pint of vodka daily but she has not had any in the past 5 days due to her symptoms. The patient has tried to quit alcohol consumption before but suffered withdrawal symptoms, although she states that her current symptoms are not similar to the ones she had during detox. She is supposed to be taking Lisinopril but has not done so in quite some time. The patient reports smoking half a pack of cigarettes daily but denies drug usage. There are no other acute complaints at this time.      History provided by:  Patient and relative  Vomiting   The primary symptoms include abdominal pain, nausea and vomiting. Primary symptoms do not include fever, hematemesis, jaundice, hematochezia or dysuria. The illness began more than 7 days ago. The onset was  sudden. The problem has been gradually worsening.   The illness does not include chills, bloating or back pain. Significant associated medical issues include alcohol abuse. Associated medical issues do not include inflammatory bowel disease, gallstones, hemorrhoids or diverticulitis. Risk factors for a gastrointestinal illness include alcohol use.       Review of Systems   Constitutional: Negative for chills and fever.   Eyes: Positive for photophobia and visual disturbance.        The patient reports that she has been seeing small black spots on the outskirts of her visual fields.   Cardiovascular: Positive for chest pain.        The patient was reportedly hypertensive at her appointment earlier today with her primary care provider, Dr. Toledo.   Gastrointestinal: Positive for abdominal pain, nausea and vomiting. Negative for bloating, blood in stool, hematemesis, hematochezia and jaundice.        The patient denies any abnormally colored stool.   Genitourinary: Negative for dysuria.   Musculoskeletal: Negative for back pain.   Skin: Positive for pallor.   Neurological: Positive for tremors, weakness, numbness and headaches. Negative for syncope.        The patient complains of a headache behind her eyes and in her temporal regions. Most severe behind the right eye.  She complains of weakness in her left upper extremity.  The patient complains of numbness on the left side of her mouth.  Resting tremor in her bilateral hands.   All other systems reviewed and are negative.      Past Medical History:   Diagnosis Date   • Migraine    • Seizures (CMS/HCC)        No Known Allergies    Past Surgical History:   Procedure Laterality Date   • HYSTERECTOMY     • TUBAL ABDOMINAL LIGATION         History reviewed. No pertinent family history.    Social History     Socioeconomic History   • Marital status: Single     Spouse name: Not on file   • Number of children: Not on file   • Years of education: Not on file   • Highest  education level: Not on file   Tobacco Use   • Smoking status: Current Every Day Smoker     Packs/day: 0.50     Types: Cigarettes   Substance and Sexual Activity   • Alcohol use: Yes     Comment: last drank 5 days ago  usually liquor          Objective   Physical Exam   Constitutional: She is oriented to person, place, and time. She appears well-developed and well-nourished.  Non-toxic appearance. No distress.   The patient is thin.   HENT:   Head: Normocephalic and atraumatic.   Mouth/Throat: Mucous membranes are dry.   Eyes: Conjunctivae are normal. No scleral icterus.   Neck: Normal range of motion. Neck supple.   Cardiovascular: Regular rhythm and normal heart sounds. Tachycardia present.   No murmur heard.  Mild tachycardia.   Pulmonary/Chest: Effort normal and breath sounds normal. No respiratory distress.   Abdominal: Soft. There is no tenderness.   Musculoskeletal: Normal range of motion. She exhibits no edema.   Neurological: She is alert and oriented to person, place, and time. No cranial nerve deficit.   The patient has a resting tremor in her bilateral hands.  Cranial nerves 2-12 are normal.   Skin: Skin is warm and dry. There is pallor.   Psychiatric: She has a normal mood and affect. Her behavior is normal.   Nursing note and vitals reviewed.      Procedures         ED Course  ED Course as of Jan 22 2130   Tue Jan 21, 2020 1942 Ms. Lazar feels much better and wanting to go eat requesting discharge. I offered evaluation for detox she declined politely    [JONNY]      ED Course User Index  [JONNY] Yonathan Patel PA                                       No results found for this or any previous visit (from the past 24 hour(s)).  Note: In addition to lab results from this visit, the labs listed above may include labs taken at another facility or during a different encounter within the last 24 hours. Please correlate lab times with ED admission and discharge times for further clarification of the  "services performed during this visit.    CT Head Without Contrast   Preliminary Result   No acute intracranial process appreciated.       D:  01/21/2020   E:  01/22/2020              XR Chest 1 View   Final Result   1.  Chronic appearing changes of the lungs without convincing evidence   for acute osseous abnormality.       2.  Incidentally identified within the left upper lobe is a possible 1   cm pulmonary nodule. Consider follow-up CT imaging evaluation of the   chest as clinically appropriate for further evaluation.       D:  01/21/2020   E:  01/21/2020       This report was finalized on 1/22/2020 8:59 AM by Dr. Pola Knowles MD.            Vitals:    01/21/20 1502 01/21/20 1545 01/21/20 1631 01/21/20 1700   BP: (!) 186/99 (!) 149/116  122/66   BP Location: Left arm      Patient Position: Sitting      Pulse: 88 78  75   Resp: 20  16    Temp: 99.1 °F (37.3 °C)      TempSrc: Oral      SpO2: 100% 98%  97%   Weight: 64.4 kg (142 lb)      Height: 162.6 cm (64\")        Medications   LORazepam (ATIVAN) injection 1 mg (1 mg Intravenous Given 1/21/20 1554)   metoclopramide (REGLAN) injection 10 mg (10 mg Intravenous Given 1/21/20 1554)   diphenhydrAMINE (BENADRYL) injection 25 mg (25 mg Intravenous Given 1/21/20 1554)   sodium chloride 0.9 % bolus 2,000 mL (0 mL Intravenous Stopped 1/21/20 1703)   multiple vitamin (M.V.I. Adult) 10 mL, thiamine (B-1) 100 mg, folic acid 1 mg, magnesium sulfate 2 g in sodium chloride 0.9 % 1,000 mL infusion (0 mL/hr Intravenous Stopped 1/21/20 1859)     ECG/EMG Results (last 24 hours)     Procedure Component Value Units Date/Time    ECG 12 Lead [[id number]] Collected:  01/21/20 1519     Updated:  01/21/20 1533        ECG 12 Lead                           MDM  Number of Diagnoses or Management Options  Alcohol withdrawal syndrome without complication (CMS/HCC): new and requires workup  Nausea and vomiting, intractability of vomiting not specified, unspecified vomiting type: new and " Render Note In Bullet Format When Appropriate: No requires workup     Amount and/or Complexity of Data Reviewed  Clinical lab tests: reviewed and ordered  Tests in the radiology section of CPT®: reviewed and ordered  Tests in the medicine section of CPT®: ordered and reviewed  Discuss the patient with other providers: yes    Patient Progress  Patient progress: stable      Final diagnoses:   Alcohol withdrawal syndrome without complication (CMS/HCC)   Nausea and vomiting, intractability of vomiting not specified, unspecified vomiting type       Documentation assistance provided by susie Delgado.  Information recorded by the scribe was done at my direction and has been verified and validated by me.     Stefan Delgado  01/21/20 1600       Yonathan Patel PA  01/22/20 5585     Detail Level: Detailed Duration Of Freeze Thaw-Cycle (Seconds): 0 Post-Care Instructions: I reviewed with the patient in detail post-care instructions. Patient is to wear sunprotection, and avoid picking at any of the treated lesions. Pt may apply Vaseline to crusted or scabbing areas. Consent: The patient's consent was obtained including but not limited to risks of crusting, scabbing, blistering, scarring, darker or lighter pigmentary change, recurrence, incomplete removal and infection.

## 2021-06-10 ENCOUNTER — LAB (OUTPATIENT)
Dept: LAB | Facility: HOSPITAL | Age: 46
End: 2021-06-10

## 2021-06-10 DIAGNOSIS — I15.2 ADRENAL HYPERTENSION (HCC): ICD-10-CM

## 2021-06-10 DIAGNOSIS — E27.9 ADRENAL HYPERTENSION (HCC): ICD-10-CM

## 2021-06-10 LAB — BACTERIA SPEC AEROBE CULT: ABNORMAL

## 2021-06-10 PROCEDURE — 82384 ASSAY THREE CATECHOLAMINES: CPT

## 2021-06-10 PROCEDURE — 83835 ASSAY OF METANEPHRINES: CPT

## 2021-06-10 PROCEDURE — 81050 URINALYSIS VOLUME MEASURE: CPT

## 2021-06-12 LAB
DOPAMINE SERPL-MCNC: <30 PG/ML (ref 0–48)
EPINEPH PLAS-MCNC: <15 PG/ML (ref 0–62)
NOREPINEPH PLAS-MCNC: 112 PG/ML (ref 0–874)

## 2021-06-13 LAB — ALDOST SERPL-MCNC: 2.7 NG/DL (ref 0–30)

## 2021-06-16 LAB
METANEPH 24H UR-MRATE: 22 UG/24 HR (ref 36–209)
METANEPHS 24H UR-MCNC: 26 UG/L
NORMETANEPHRINE 24H UR-MCNC: 104 UG/L
NORMETANEPHRINE 24H UR-MRATE: 88 UG/24 HR (ref 131–612)

## 2021-06-17 LAB
DOPAMINE 24H UR-MRATE: 105 UG/24 HR (ref 0–510)
DOPAMINE UR-MCNC: 123 UG/L
EPINEPH 24H UR-MRATE: 0 UG/24 HR (ref 0–20)
EPINEPH UR-MCNC: <1 UG/L
NOREPINEPH 24H UR-MRATE: 6 UG/24 HR (ref 0–135)
NOREPINEPH UR-MCNC: 7 UG/L

## 2024-05-09 ENCOUNTER — OFFICE VISIT (OUTPATIENT)
Dept: CARDIOLOGY | Facility: CLINIC | Age: 49
End: 2024-05-09
Payer: COMMERCIAL

## 2024-05-09 VITALS
TEMPERATURE: 98 F | BODY MASS INDEX: 26.16 KG/M2 | OXYGEN SATURATION: 95 % | SYSTOLIC BLOOD PRESSURE: 118 MMHG | DIASTOLIC BLOOD PRESSURE: 74 MMHG | HEART RATE: 110 BPM | WEIGHT: 157 LBS | HEIGHT: 65 IN

## 2024-05-09 DIAGNOSIS — R07.9 CHEST PAIN, UNSPECIFIED TYPE: Primary | ICD-10-CM

## 2024-05-09 DIAGNOSIS — I31.39 PERICARDIAL EFFUSION: ICD-10-CM

## 2024-05-09 DIAGNOSIS — I10 ESSENTIAL HYPERTENSION: ICD-10-CM

## 2024-05-09 PROCEDURE — 99204 OFFICE O/P NEW MOD 45 MIN: CPT | Performed by: INTERNAL MEDICINE

## 2024-05-09 PROCEDURE — 93000 ELECTROCARDIOGRAM COMPLETE: CPT | Performed by: INTERNAL MEDICINE

## 2024-05-09 PROCEDURE — 3078F DIAST BP <80 MM HG: CPT | Performed by: INTERNAL MEDICINE

## 2024-05-09 PROCEDURE — 3074F SYST BP LT 130 MM HG: CPT | Performed by: INTERNAL MEDICINE

## 2024-05-09 RX ORDER — ALBUTEROL SULFATE 90 UG/1
AEROSOL, METERED RESPIRATORY (INHALATION)
COMMUNITY

## 2024-05-09 RX ORDER — ROSUVASTATIN CALCIUM 5 MG/1
TABLET, COATED ORAL
COMMUNITY
Start: 2024-05-02

## 2024-05-09 RX ORDER — PROMETHAZINE HYDROCHLORIDE 25 MG/1
TABLET ORAL
COMMUNITY
Start: 2024-02-12

## 2024-05-09 RX ORDER — CALCIUM CARBONATE 500 MG/1
500 TABLET, CHEWABLE ORAL
COMMUNITY

## 2024-05-09 RX ORDER — ACETAMINOPHEN 325 MG/1
650 TABLET ORAL EVERY 6 HOURS PRN
COMMUNITY

## 2024-05-09 RX ORDER — METOPROLOL SUCCINATE 25 MG/1
25 TABLET, EXTENDED RELEASE ORAL DAILY
Qty: 90 TABLET | Refills: 3 | Status: SHIPPED | OUTPATIENT
Start: 2024-05-09

## 2024-05-09 RX ORDER — IBUPROFEN 200 MG
400 TABLET ORAL EVERY 6 HOURS PRN
COMMUNITY

## 2024-05-09 RX ORDER — NIFEDIPINE 30 MG/1
30 TABLET, EXTENDED RELEASE ORAL DAILY
COMMUNITY

## 2024-05-09 RX ORDER — KETOROLAC TROMETHAMINE 10 MG/1
TABLET, FILM COATED ORAL
COMMUNITY
Start: 2024-05-03

## 2024-05-13 ENCOUNTER — PATIENT ROUNDING (BHMG ONLY) (OUTPATIENT)
Dept: CARDIOLOGY | Facility: CLINIC | Age: 49
End: 2024-05-13
Payer: COMMERCIAL

## 2024-05-13 NOTE — PROGRESS NOTES
May 13, 2024    Hello, may I speak with Jonna Lazar?    My name is QUIANA     I am  with [facility]  [facility]  [address] [place] [address].    Before we get started may I verify your date of birth? 1975    I am calling to officially welcome you to our practice and ask about your recent visit. Is this a good time to talk? yes    Tell me about your visit with us. What things went well?  FRIENDLY, REALLY LIKE DOCTOR, FRIENDLY        We're always looking for ways to make our patients' experiences even better. Do you have recommendations on ways we may improve?  no - EVERYTHING WAS GREAT           I appreciate you taking the time to speak with me today. Is there anything else I can do for you? Yes - DISCUSSED TESTS       Thank you, and have a great day.

## 2024-05-16 ENCOUNTER — TELEPHONE (OUTPATIENT)
Dept: CARDIOLOGY | Facility: CLINIC | Age: 49
End: 2024-05-16
Payer: COMMERCIAL

## 2024-05-16 NOTE — TELEPHONE ENCOUNTER
[place] [facility] [place] 7:30, 7:00 [place]. Patient contacted and instructions reviewed.

## 2024-06-10 ENCOUNTER — TELEPHONE (OUTPATIENT)
Dept: CARDIOLOGY | Facility: CLINIC | Age: 49
End: 2024-06-10
Payer: COMMERCIAL

## 2024-06-10 NOTE — TELEPHONE ENCOUNTER
----- Message from Russel Mendez sent at 6/7/2024 10:48 PM EDT -----  Please inform patient that her transthoracic echocardiogram showed normal function, mild stiffening of the walls of the heart and mild leakage of the mitral valve connecting the left upper and lower chamber. Likely related to high blood pressure. Medical therapy.. Thank you!

## 2024-06-10 NOTE — TELEPHONE ENCOUNTER
Tried calling patient back. Medical therapy means to control risk factors such as Hypertension with medication dies and exercise. No need for surgical intervention at this time. Will follow up with echos every 1-3 years as needed.

## 2024-08-12 ENCOUNTER — OFFICE VISIT (OUTPATIENT)
Dept: CARDIOLOGY | Facility: CLINIC | Age: 49
End: 2024-08-12
Payer: COMMERCIAL

## 2024-08-12 VITALS
OXYGEN SATURATION: 99 % | WEIGHT: 156 LBS | HEART RATE: 88 BPM | BODY MASS INDEX: 25.99 KG/M2 | HEIGHT: 65 IN | RESPIRATION RATE: 18 BRPM | DIASTOLIC BLOOD PRESSURE: 92 MMHG | SYSTOLIC BLOOD PRESSURE: 148 MMHG

## 2024-08-12 DIAGNOSIS — R07.9 CHEST PAIN, UNSPECIFIED TYPE: ICD-10-CM

## 2024-08-12 DIAGNOSIS — I31.39 PERICARDIAL EFFUSION: ICD-10-CM

## 2024-08-12 DIAGNOSIS — I1A.0 RESISTANT HYPERTENSION: Primary | ICD-10-CM

## 2024-08-12 PROCEDURE — 1160F RVW MEDS BY RX/DR IN RCRD: CPT | Performed by: INTERNAL MEDICINE

## 2024-08-12 PROCEDURE — 1159F MED LIST DOCD IN RCRD: CPT | Performed by: INTERNAL MEDICINE

## 2024-08-12 PROCEDURE — 3077F SYST BP >= 140 MM HG: CPT | Performed by: INTERNAL MEDICINE

## 2024-08-12 PROCEDURE — 3080F DIAST BP >= 90 MM HG: CPT | Performed by: INTERNAL MEDICINE

## 2024-08-12 PROCEDURE — 99214 OFFICE O/P EST MOD 30 MIN: CPT | Performed by: INTERNAL MEDICINE

## 2024-08-12 RX ORDER — METOPROLOL SUCCINATE 50 MG/1
50 TABLET, EXTENDED RELEASE ORAL DAILY
Qty: 90 TABLET | Refills: 3 | Status: SHIPPED | OUTPATIENT
Start: 2024-08-12

## 2024-08-12 RX ORDER — AZILSARTAN KAMEDOXOMIL 40 MG/1
40 TABLET ORAL DAILY
Qty: 30 TABLET | Refills: 11 | Status: SHIPPED | OUTPATIENT
Start: 2024-08-12 | End: 2024-08-12

## 2024-08-12 RX ORDER — AZILSARTAN KAMEDOXOMIL 40 MG/1
40 TABLET ORAL DAILY
Qty: 30 TABLET | Refills: 11 | Status: SHIPPED | OUTPATIENT
Start: 2024-08-12 | End: 2024-08-13

## 2024-08-12 RX ORDER — AMLODIPINE BESYLATE 5 MG/1
5 TABLET ORAL DAILY
Qty: 90 TABLET | Refills: 3 | Status: SHIPPED | OUTPATIENT
Start: 2024-08-12

## 2024-08-12 NOTE — PROGRESS NOTES
[facility]  [facility]  [address] [place] [address]  Dept: [phone]  Dept Fax: [phone]    Date: 08/12/2024  Patient: Jonna Lazar  YOB: 1975    Follow Up Office Visit Note    Interval Follow-up  Ms. Jonna Lazar is a 49 y.o. female who is here for follow-up on Chest Pain.    Subjective   Patient did her echo but did not do her stress test.  Still complaining of a some chest pain.  Not taking lisinopril HCTZ.  Wants to come off nifedipine because make her feel sick.  Patient denies orthopnea, PND, palpitations, lightheadedness, syncope or medications side-effects.    The following portions of the patient's history were reviewed and updated as appropriate: allergies, current medications, past family history, past medical history, past social history, past surgical history, and problem list.    Medications: No Known Allergies   Current Outpatient Medications   Medication Instructions    acetaminophen (TYLENOL) 650 mg, Oral, Every 6 Hours PRN    albuterol sulfate  (90 Base) MCG/ACT inhaler Inhale 1-2 puffs by mouth every 4 hours as needed    amLODIPine (NORVASC) 5 mg, Oral, Daily    calcium carbonate (TUMS) 500 mg, Oral    cetirizine (ZYRTEC) 10 mg, Oral, Daily    Edarbi 40 mg, Oral, Daily    ibuprofen (ADVIL,MOTRIN) 400 mg, Oral, Every 6 Hours PRN    ketorolac (TORADOL) 10 MG tablet     metoprolol succinate XL (TOPROL-XL) 50 mg, Oral, Daily    omeprazole (PRILOSEC) 20 mg, Oral    ondansetron (ZOFRAN) 8 mg, Oral    ondansetron ODT (ZOFRAN-ODT) 4 mg, Oral, Every 8 Hours PRN    promethazine (PHENERGAN) 25 MG tablet     QUEtiapine (SEROQUEL) 100 mg, Oral, Nightly    QUEtiapine (SEROQUEL) 50 mg, Oral, Nightly, Patient reports taking 50 mg 2-3 times a day     rosuvastatin (CRESTOR) 5 MG tablet        Tobacco Use: High Risk (8/12/2024)    Patient History     Smoking Tobacco Use: Every Day     Smokeless Tobacco Use: Never     Passive Exposure:  "Current        Objective  Vitals:    08/12/24 1045   BP: 148/92   Pulse: 88   Resp: 18   SpO2: 99%   Weight: 70.8 kg (156 lb)   Height: 165.1 cm (65\")   PainSc: 0-No pain      Vitals:    08/12/24 1045   BP: 148/92   Pulse: 88   Resp: 18   SpO2: 99%   Weight: 70.8 kg (156 lb)   Height: 165.1 cm (65\")          Physical Exam  Constitutional:       Appearance: Not in distress.   Neck:      Vascular: JVD normal.   Pulmonary:      Breath sounds: Normal breath sounds.   Cardiovascular:      Normal rate. Regular rhythm.      Murmurs: There is no murmur.   Pulses:     Intact distal pulses.   Edema:     Peripheral edema absent.   Neurological:      Mental Status: Alert.              Diagnostic Data  Lab Results   Component Value Date     06/07/2022    K 3.8 06/07/2022     06/07/2022    CO2 26 06/07/2022    MG 1.8 (L) 04/04/2022    BUN 10 06/07/2022    CREATININE 1.10 06/07/2022    CALCIUM 9.2 06/07/2022    BILITOT 1.2 (H) 06/07/2022    ALKPHOS 106 (H) 06/07/2022    ALT 12 06/07/2022    AST 15 06/07/2022    GLUCOSE 116 (H) 04/03/2022    ALBUMIN 4.4 06/07/2022     Lab Results   Component Value Date    WBC 8.42 06/07/2022    HGB 12.1 06/07/2022    HCT 37.0 06/07/2022     06/07/2022     Lab Results   Component Value Date    INR 0.9 04/03/2022     Lab Results   Component Value Date    TROPONINT 13 04/03/2022    TROPONINT 2 04/03/2022    TROPONINT 15 (H) 04/03/2022     Lab Results   Component Value Date    PROBNP 225.5 01/21/2020       No results found for: \"CHLPL\", \"TRIG\", \"HDL\", \"LDL\"  No results found for: \"TSH\", \"FREET4\"    CV Diagnostics:  Procedures    CXR: No results found for this or any previous visit.     ECHO/MUGA: Results for orders placed in visit on 06/07/24    Adult Transthoracic Echo Complete W/ Cont if Necessary Per Protocol    Interpretation Summary    Left ventricular systolic function is normal. Estimated left ventricular EF = 60% Left ventricular ejection fraction appears to be 56 - 60%.    " Mild mitral valve regurgitation.    Left ventricular diastolic function is consistent with (grade I) impaired relaxation.     STRESS TESTS: No results found for this or any previous visit.     CARDIAC CATH: No results found for this or any previous visit.     DEVICES: No valid procedures specified.   HOLTER: No results found for this or any previous visit.     CT/MRI:  Results for orders placed in visit on 03/10/20    CT Angiogram Chest With & Without Contrast    Narrative  EXAMINATION: CT ANGIOGRAM CHEST W WO CONTRAST- 04/07/2020    INDICATION: Pulmonary AVM suspected    TECHNIQUE: CT angiogram chest with and without intravenous contrast; 2-D  and 3-D reconstructions performed.    The radiation dose reduction device was turned on for each scan per the  ALARA (As Low as Reasonably Achievable) protocol.    COMPARISON: CT outside dated 02/18/2020    FINDINGS: Thyroid is homogeneous in attenuation. No bulky mediastinal  adenopathy. Central airways are patent. Esophagus in normal course and  caliber. Patent nonaneurysmal thoracic aorta with patent great vessel  origins. Central pulmonary arteries are grossly patent. Early venous  phase imaging demonstrates draining vein of likely pulmonary AVM left  upper lobe measuring 1.6 cm unchanged from prior in overall appearance  with draining vein seen on series 6 image 37 and 38 for example. A  separate site of likely smaller but vascular origin pulmonary AVM noted  just left of midline anterior left upper lobe measuring 1 cm  additionally noted seen series 6 image 40. No dominant nodule or mass is  otherwise identified. No effusion. No anomalous pulmonary venous return  clearly evident. Descending thoracic aorta unremarkable. Visualized  portions of the upper abdomen demonstrate right adrenal fat density 4 cm  myelolipoma.    Impression  1. There are 2 separate areas within the left upper lobe with a dominant  focus 1.6 cm unchanged from 02/18/2020 comparison examination  with  direct connection of early venous draining concerning for pulmonary AVM  with a separate smaller but similar-appearing focus of vascular  involvement and serpiginous venous connection within the more anterior  and near midline left upper lobe 1 cm maximum dimension concerning for  additional smaller pulmonary AVM.    2. Right adrenal myelolipoma.    D:  04/07/2020  E:  04/07/2020    This report was finalized on 4/7/2020 2:16 PM by Dr. Nick Crane.    VASCULAR: No valid procedures specified.     Assessment and Plan  Diagnoses and all orders for this visit:    1. Resistant hypertension (Primary)  Assessment & Plan:  Hypertension is uncontrolled  Medication changes per orders.  Dietary sodium restriction.  Weight loss.  Regular aerobic exercise.  Stop smoking.  Ambulatory blood pressure monitoring.  Uptitrate metoprolol succinate ER to 50 mg p.o. daily.  Discontinue nifedipine 30 mg p.o. daily in view of poor tolerance, and restart amlodipine 5 mg p.o. daily tolerated well in the past.  Add Edarbi 40 mg p.o. daily for resistant hypertension.  Blood pressure will be reassessedin 3 months.    Orders:  -     metoprolol succinate XL (TOPROL-XL) 50 MG 24 hr tablet; Take 1 tablet by mouth Daily.  Dispense: 90 tablet; Refill: 3  -     Discontinue: Azilsartan Medoxomil (Edarbi) 40 MG tablet; Take 40 mg by mouth Daily.  Dispense: 30 tablet; Refill: 11  -     Azilsartan Medoxomil (Edarbi) 40 MG tablet; Take 40 mg by mouth Daily.  Dispense: 30 tablet; Refill: 11    2. Chest pain, unspecified type  Assessment & Plan:  Chest wall tenderness on past visits.  EKG with ischemic changes.  No chest pains when seen this morning, frequent event at home.  No lipid profile available.  Risk factors: Hypertension, smoking.  ACS ruled out in the hospital by negative troponins, and chest pain unchanged to better since then.  Patient missed her stress test scheduling at [facility].  Echo normal EF, grade 1 diastolic  dysfunction and no significant regional wall motion abnormalities limited by suboptimal windows.  Plan:  Reorder Lexiscan nuclear stress test in house  Smoking cessation  Avoidance of alcohol as discussed, with a slow wean of processed  Uptitrate metoprolol succinate ER 50 mg p.o. daily  Start amlodipine 5 mg p.o. daily as intolerant to nifedipine  Start Edarbi 40 mg p.o. daily as intolerant to lisinopril-hydrochlorothiazide  Monitor home blood pressure  Follow-up on above test results    Orders:  -     Stress Test With Myocardial Perfusion One Day; Future    3. Pericardial effusion  Assessment & Plan:  Transthoracic echocardiogram negative for pericardial disease.  Continue routine follow-up.      Other orders  -     amLODIPine (NORVASC) 5 MG tablet; Take 1 tablet by mouth Daily.  Dispense: 90 tablet; Refill: 3         Return in about 3 months (around 11/12/2024) for Follow-up with Dr Mendez.    There are no Patient Instructions on file for this visit.    Russel Mendez MD

## 2024-08-12 NOTE — ASSESSMENT & PLAN NOTE
Chest wall tenderness on past visits.  EKG with ischemic changes.  No chest pains when seen this morning, frequent event at home.  No lipid profile available.  Risk factors: Hypertension, smoking.  ACS ruled out in the hospital by negative troponins, and chest pain unchanged to better since then.  Patient missed her stress test scheduling at [facility].  Echo normal EF, grade 1 diastolic dysfunction and no significant regional wall motion abnormalities limited by suboptimal windows.  Plan:  Reorder Lexiscan nuclear stress test in house  Smoking cessation  Avoidance of alcohol as discussed, with a slow wean of processed  Uptitrate metoprolol succinate ER 50 mg p.o. daily  Start amlodipine 5 mg p.o. daily as intolerant to nifedipine  Start Edarbi 40 mg p.o. daily as intolerant to lisinopril-hydrochlorothiazide  Monitor home blood pressure  Follow-up on above test results

## 2024-08-12 NOTE — ASSESSMENT & PLAN NOTE
Hypertension is uncontrolled  Medication changes per orders.  Dietary sodium restriction.  Weight loss.  Regular aerobic exercise.  Stop smoking.  Ambulatory blood pressure monitoring.  Uptitrate metoprolol succinate ER to 50 mg p.o. daily.  Discontinue nifedipine 30 mg p.o. daily in view of poor tolerance, and restart amlodipine 5 mg p.o. daily tolerated well in the past.  Add Edarbi 40 mg p.o. daily for resistant hypertension.  Blood pressure will be reassessedin 3 months.

## 2024-08-12 NOTE — H&P (VIEW-ONLY)
[facility] [place]  [facility] CARDIOLOGY  [address] [place] [address]  Dept: [phone]  Dept Fax: [phone]    Date: 08/12/2024  Patient: Jonna Lazar  YOB: 1975    Follow Up Office Visit Note    Interval Follow-up  Ms. Jonna Lazar is a 49 y.o. female who is here for follow-up on Chest Pain.    Subjective   Patient did her echo but did not do her stress test.  Still complaining of a some chest pain.  Not taking lisinopril HCTZ.  Wants to come off nifedipine because make her feel sick.  Patient denies orthopnea, PND, palpitations, lightheadedness, syncope or medications side-effects.    The following portions of the patient's history were reviewed and updated as appropriate: allergies, current medications, past family history, past medical history, past social history, past surgical history, and problem list.    Medications: No Known Allergies   Current Outpatient Medications   Medication Instructions    acetaminophen (TYLENOL) 650 mg, Oral, Every 6 Hours PRN    albuterol sulfate  (90 Base) MCG/ACT inhaler Inhale 1-2 puffs by mouth every 4 hours as needed    amLODIPine (NORVASC) 5 mg, Oral, Daily    calcium carbonate (TUMS) 500 mg, Oral    cetirizine (ZYRTEC) 10 mg, Oral, Daily    Edarbi 40 mg, Oral, Daily    ibuprofen (ADVIL,MOTRIN) 400 mg, Oral, Every 6 Hours PRN    ketorolac (TORADOL) 10 MG tablet     metoprolol succinate XL (TOPROL-XL) 50 mg, Oral, Daily    omeprazole (PRILOSEC) 20 mg, Oral    ondansetron (ZOFRAN) 8 mg, Oral    ondansetron ODT (ZOFRAN-ODT) 4 mg, Oral, Every 8 Hours PRN    promethazine (PHENERGAN) 25 MG tablet     QUEtiapine (SEROQUEL) 100 mg, Oral, Nightly    QUEtiapine (SEROQUEL) 50 mg, Oral, Nightly, Patient reports taking 50 mg 2-3 times a day     rosuvastatin (CRESTOR) 5 MG tablet        Tobacco Use: High Risk (8/12/2024)    Patient History     Smoking Tobacco Use: Every Day     Smokeless Tobacco Use: Never     Passive Exposure:  "Current        Objective  Vitals:    08/12/24 1045   BP: 148/92   Pulse: 88   Resp: 18   SpO2: 99%   Weight: 70.8 kg (156 lb)   Height: 165.1 cm (65\")   PainSc: 0-No pain      Vitals:    08/12/24 1045   BP: 148/92   Pulse: 88   Resp: 18   SpO2: 99%   Weight: 70.8 kg (156 lb)   Height: 165.1 cm (65\")          Physical Exam  Constitutional:       Appearance: Not in distress.   Neck:      Vascular: JVD normal.   Pulmonary:      Breath sounds: Normal breath sounds.   Cardiovascular:      Normal rate. Regular rhythm.      Murmurs: There is no murmur.   Pulses:     Intact distal pulses.   Edema:     Peripheral edema absent.   Neurological:      Mental Status: Alert.              Diagnostic Data  Lab Results   Component Value Date     06/07/2022    K 3.8 06/07/2022     06/07/2022    CO2 26 06/07/2022    MG 1.8 (L) 04/04/2022    BUN 10 06/07/2022    CREATININE 1.10 06/07/2022    CALCIUM 9.2 06/07/2022    BILITOT 1.2 (H) 06/07/2022    ALKPHOS 106 (H) 06/07/2022    ALT 12 06/07/2022    AST 15 06/07/2022    GLUCOSE 116 (H) 04/03/2022    ALBUMIN 4.4 06/07/2022     Lab Results   Component Value Date    WBC 8.42 06/07/2022    HGB 12.1 06/07/2022    HCT 37.0 06/07/2022     06/07/2022     Lab Results   Component Value Date    INR 0.9 04/03/2022     Lab Results   Component Value Date    TROPONINT 13 04/03/2022    TROPONINT 2 04/03/2022    TROPONINT 15 (H) 04/03/2022     Lab Results   Component Value Date    PROBNP 225.5 01/21/2020       No results found for: \"CHLPL\", \"TRIG\", \"HDL\", \"LDL\"  No results found for: \"TSH\", \"FREET4\"    CV Diagnostics:  Procedures    CXR: No results found for this or any previous visit.     ECHO/MUGA: Results for orders placed in visit on 06/07/24    Adult Transthoracic Echo Complete W/ Cont if Necessary Per Protocol    Interpretation Summary    Left ventricular systolic function is normal. Estimated left ventricular EF = 60% Left ventricular ejection fraction appears to be 56 - 60%.    " Mild mitral valve regurgitation.    Left ventricular diastolic function is consistent with (grade I) impaired relaxation.     STRESS TESTS: No results found for this or any previous visit.     CARDIAC CATH: No results found for this or any previous visit.     DEVICES: No valid procedures specified.   HOLTER: No results found for this or any previous visit.     CT/MRI:  Results for orders placed in visit on 03/10/20    CT Angiogram Chest With & Without Contrast    Narrative  EXAMINATION: CT ANGIOGRAM CHEST W WO CONTRAST- 04/07/2020    INDICATION: Pulmonary AVM suspected    TECHNIQUE: CT angiogram chest with and without intravenous contrast; 2-D  and 3-D reconstructions performed.    The radiation dose reduction device was turned on for each scan per the  ALARA (As Low as Reasonably Achievable) protocol.    COMPARISON: CT outside dated 02/18/2020    FINDINGS: Thyroid is homogeneous in attenuation. No bulky mediastinal  adenopathy. Central airways are patent. Esophagus in normal course and  caliber. Patent nonaneurysmal thoracic aorta with patent great vessel  origins. Central pulmonary arteries are grossly patent. Early venous  phase imaging demonstrates draining vein of likely pulmonary AVM left  upper lobe measuring 1.6 cm unchanged from prior in overall appearance  with draining vein seen on series 6 image 37 and 38 for example. A  separate site of likely smaller but vascular origin pulmonary AVM noted  just left of midline anterior left upper lobe measuring 1 cm  additionally noted seen series 6 image 40. No dominant nodule or mass is  otherwise identified. No effusion. No anomalous pulmonary venous return  clearly evident. Descending thoracic aorta unremarkable. Visualized  portions of the upper abdomen demonstrate right adrenal fat density 4 cm  myelolipoma.    Impression  1. There are 2 separate areas within the left upper lobe with a dominant  focus 1.6 cm unchanged from 02/18/2020 comparison examination  with  direct connection of early venous draining concerning for pulmonary AVM  with a separate smaller but similar-appearing focus of vascular  involvement and serpiginous venous connection within the more anterior  and near midline left upper lobe 1 cm maximum dimension concerning for  additional smaller pulmonary AVM.    2. Right adrenal myelolipoma.    D:  04/07/2020  E:  04/07/2020    This report was finalized on 4/7/2020 2:16 PM by Dr. Nick Crane.    VASCULAR: No valid procedures specified.     Assessment and Plan  Diagnoses and all orders for this visit:    1. Resistant hypertension (Primary)  Assessment & Plan:  Hypertension is uncontrolled  Medication changes per orders.  Dietary sodium restriction.  Weight loss.  Regular aerobic exercise.  Stop smoking.  Ambulatory blood pressure monitoring.  Uptitrate metoprolol succinate ER to 50 mg p.o. daily.  Discontinue nifedipine 30 mg p.o. daily in view of poor tolerance, and restart amlodipine 5 mg p.o. daily tolerated well in the past.  Add Edarbi 40 mg p.o. daily for resistant hypertension.  Blood pressure will be reassessedin 3 months.    Orders:  -     metoprolol succinate XL (TOPROL-XL) 50 MG 24 hr tablet; Take 1 tablet by mouth Daily.  Dispense: 90 tablet; Refill: 3  -     Discontinue: Azilsartan Medoxomil (Edarbi) 40 MG tablet; Take 40 mg by mouth Daily.  Dispense: 30 tablet; Refill: 11  -     Azilsartan Medoxomil (Edarbi) 40 MG tablet; Take 40 mg by mouth Daily.  Dispense: 30 tablet; Refill: 11    2. Chest pain, unspecified type  Assessment & Plan:  Chest wall tenderness on past visits.  EKG with ischemic changes.  No chest pains when seen this morning, frequent event at home.  No lipid profile available.  Risk factors: Hypertension, smoking.  ACS ruled out in the hospital by negative troponins, and chest pain unchanged to better since then.  Patient missed her stress test scheduling at [facility].  Echo normal EF, grade 1 diastolic  dysfunction and no significant regional wall motion abnormalities limited by suboptimal windows.  Plan:  Reorder Lexiscan nuclear stress test in house  Smoking cessation  Avoidance of alcohol as discussed, with a slow wean of processed  Uptitrate metoprolol succinate ER 50 mg p.o. daily  Start amlodipine 5 mg p.o. daily as intolerant to nifedipine  Start Edarbi 40 mg p.o. daily as intolerant to lisinopril-hydrochlorothiazide  Monitor home blood pressure  Follow-up on above test results    Orders:  -     Stress Test With Myocardial Perfusion One Day; Future    3. Pericardial effusion  Assessment & Plan:  Transthoracic echocardiogram negative for pericardial disease.  Continue routine follow-up.      Other orders  -     amLODIPine (NORVASC) 5 MG tablet; Take 1 tablet by mouth Daily.  Dispense: 90 tablet; Refill: 3         Return in about 3 months (around 11/12/2024) for Follow-up with Dr Mendez.    There are no Patient Instructions on file for this visit.    Russel Mendez MD

## 2024-08-13 RX ORDER — OLMESARTAN MEDOXOMIL 20 MG/1
20 TABLET ORAL DAILY
Qty: 90 TABLET | Refills: 3 | Status: SHIPPED | OUTPATIENT
Start: 2024-08-13

## 2024-08-19 ENCOUNTER — TELEPHONE (OUTPATIENT)
Dept: CARDIOLOGY | Facility: CLINIC | Age: 49
End: 2024-08-19
Payer: COMMERCIAL

## 2024-08-19 NOTE — TELEPHONE ENCOUNTER
Patient to call back. HUB transfer call to the office. Needs to repeat full nuclear stress test, but this won't be able to schedule off referral. Choices are either 8/22 1:30,   or 8/23 9:30. I have put a hold on both slots, please tell me which one she chooses.

## 2024-08-26 ENCOUNTER — TELEPHONE (OUTPATIENT)
Dept: CARDIOLOGY | Facility: CLINIC | Age: 49
End: 2024-08-26
Payer: COMMERCIAL

## 2024-08-26 DIAGNOSIS — R94.39 ABNORMAL NUCLEAR STRESS TEST: ICD-10-CM

## 2024-08-26 DIAGNOSIS — R07.9 CHEST PAIN, UNSPECIFIED TYPE: Primary | ICD-10-CM

## 2024-08-26 NOTE — TELEPHONE ENCOUNTER
Patient notified of lexiscan results, patient verbalized understanding.   Options of cardiac cath vs CTA (with possible follow up cath) given to patient. Patient wishes to discuss with family member and call us back with decision.

## 2024-08-26 NOTE — TELEPHONE ENCOUNTER
----- Message from Russel Mendez sent at 8/23/2024  6:13 PM EDT -----  Please inform patient that her lexiscan nuclear stress test was abnormal, showing, despite significant artifact, possible old heart attack with areas that are still viable.  If she is having significant of chest pain it is recommended to proceed with cardiac catheterization to identify and fix the problem.  If asymptomatic, she can consider alternatively a CTA coronary that will yield the same diagnostic value of the cardiac catheterization, but if abnormal patient will still have to do a cardiac cath, as well as there are delays up to 2 months for scheduling.  Please let us know patient preference would be.  Thank you!

## 2024-08-28 PROBLEM — R94.39 ABNORMAL NUCLEAR STRESS TEST: Status: ACTIVE | Noted: 2024-08-26

## 2024-08-29 ENCOUNTER — PREP FOR SURGERY (OUTPATIENT)
Dept: OTHER | Facility: HOSPITAL | Age: 49
End: 2024-08-29
Payer: COMMERCIAL

## 2024-08-29 RX ORDER — SODIUM CHLORIDE 9 MG/ML
40 INJECTION, SOLUTION INTRAVENOUS AS NEEDED
OUTPATIENT
Start: 2024-08-29

## 2024-08-29 RX ORDER — SODIUM CHLORIDE 0.9 % (FLUSH) 0.9 %
10 SYRINGE (ML) INJECTION AS NEEDED
OUTPATIENT
Start: 2024-08-29

## 2024-08-29 RX ORDER — ASPIRIN 81 MG/1
81 TABLET ORAL DAILY
OUTPATIENT
Start: 2024-08-30

## 2024-08-29 RX ORDER — ASPIRIN 81 MG/1
324 TABLET, CHEWABLE ORAL ONCE
OUTPATIENT
Start: 2024-08-29 | End: 2024-08-29

## 2024-08-29 RX ORDER — NITROGLYCERIN 0.4 MG/1
0.4 TABLET SUBLINGUAL
OUTPATIENT
Start: 2024-08-29

## 2024-08-29 RX ORDER — SODIUM CHLORIDE 0.9 % (FLUSH) 0.9 %
10 SYRINGE (ML) INJECTION EVERY 12 HOURS SCHEDULED
OUTPATIENT
Start: 2024-08-29

## 2024-08-29 RX ORDER — ACETAMINOPHEN 325 MG/1
650 TABLET ORAL EVERY 4 HOURS PRN
OUTPATIENT
Start: 2024-08-29

## 2024-09-03 ENCOUNTER — HOSPITAL ENCOUNTER (OUTPATIENT)
Facility: HOSPITAL | Age: 49
Setting detail: HOSPITAL OUTPATIENT SURGERY
Discharge: HOME | End: 2024-09-03
Attending: INTERNAL MEDICINE | Admitting: INTERNAL MEDICINE
Payer: COMMERCIAL

## 2024-09-03 VITALS
TEMPERATURE: 97.5 F | RESPIRATION RATE: 18 BRPM | SYSTOLIC BLOOD PRESSURE: 107 MMHG | DIASTOLIC BLOOD PRESSURE: 57 MMHG | HEART RATE: 77 BPM | WEIGHT: 167.11 LBS | HEIGHT: 63 IN | BODY MASS INDEX: 29.61 KG/M2 | OXYGEN SATURATION: 98 %

## 2024-09-03 DIAGNOSIS — R94.39 ABNORMAL NUCLEAR STRESS TEST: ICD-10-CM

## 2024-09-03 DIAGNOSIS — R07.9 CHEST PAIN, UNSPECIFIED TYPE: ICD-10-CM

## 2024-09-03 LAB
ALBUMIN SERPL-MCNC: 4.5 G/DL (ref 3.5–5.2)
ALBUMIN/GLOB SERPL: 1.7 G/DL
ALP SERPL-CCNC: 78 U/L (ref 39–117)
ALT SERPL W P-5'-P-CCNC: 18 U/L (ref 1–33)
ANION GAP SERPL CALCULATED.3IONS-SCNC: 11 MMOL/L (ref 5–15)
AST SERPL-CCNC: 17 U/L (ref 1–32)
BILIRUB SERPL-MCNC: 0.6 MG/DL (ref 0–1.2)
BUN SERPL-MCNC: 20 MG/DL (ref 6–20)
BUN/CREAT SERPL: 20.6 (ref 7–25)
CALCIUM SPEC-SCNC: 9.5 MG/DL (ref 8.6–10.5)
CHLORIDE SERPL-SCNC: 105 MMOL/L (ref 98–107)
CHOLEST SERPL-MCNC: 190 MG/DL (ref 0–200)
CO2 SERPL-SCNC: 24 MMOL/L (ref 22–29)
CREAT BLDA-MCNC: 0.9 MG/DL (ref 0.6–1.3)
CREAT SERPL-MCNC: 0.97 MG/DL (ref 0.57–1)
DEPRECATED RDW RBC AUTO: 46.2 FL (ref 37–54)
EGFRCR SERPLBLD CKD-EPI 2021: 71.3 ML/MIN/1.73
ERYTHROCYTE [DISTWIDTH] IN BLOOD BY AUTOMATED COUNT: 13.5 % (ref 12.3–15.4)
GLOBULIN UR ELPH-MCNC: 2.6 GM/DL
GLUCOSE SERPL-MCNC: 92 MG/DL (ref 65–99)
HCT VFR BLD AUTO: 40 % (ref 34–46.6)
HDLC SERPL-MCNC: 43 MG/DL (ref 40–60)
HGB BLD-MCNC: 13.3 G/DL (ref 12–15.9)
LDLC SERPL CALC-MCNC: 117 MG/DL (ref 0–100)
LDLC/HDLC SERPL: 2.64 {RATIO}
MCH RBC QN AUTO: 30.5 PG (ref 26.6–33)
MCHC RBC AUTO-ENTMCNC: 33.3 G/DL (ref 31.5–35.7)
MCV RBC AUTO: 91.7 FL (ref 79–97)
PLATELET # BLD AUTO: 331 10*3/MM3 (ref 140–450)
PMV BLD AUTO: 11.2 FL (ref 6–12)
POTASSIUM SERPL-SCNC: 4 MMOL/L (ref 3.5–5.2)
PROT SERPL-MCNC: 7.1 G/DL (ref 6–8.5)
RBC # BLD AUTO: 4.36 10*6/MM3 (ref 3.77–5.28)
SODIUM SERPL-SCNC: 140 MMOL/L (ref 136–145)
TRIGL SERPL-MCNC: 168 MG/DL (ref 0–150)
VLDLC SERPL-MCNC: 30 MG/DL (ref 5–40)
WBC NRBC COR # BLD AUTO: 11.85 10*3/MM3 (ref 3.4–10.8)

## 2024-09-03 PROCEDURE — 25010000002 FENTANYL CITRATE (PF) 50 MCG/ML SOLUTION: Performed by: INTERNAL MEDICINE

## 2024-09-03 PROCEDURE — 25810000003 SODIUM CHLORIDE 0.9 % SOLUTION: Performed by: INTERNAL MEDICINE

## 2024-09-03 PROCEDURE — 25010000002 NICARDIPINE 2.5 MG/ML SOLUTION: Performed by: INTERNAL MEDICINE

## 2024-09-03 PROCEDURE — C1769 GUIDE WIRE: HCPCS | Performed by: INTERNAL MEDICINE

## 2024-09-03 PROCEDURE — 80061 LIPID PANEL: CPT | Performed by: PHYSICIAN ASSISTANT

## 2024-09-03 PROCEDURE — 25010000002 MIDAZOLAM PER 1 MG: Performed by: INTERNAL MEDICINE

## 2024-09-03 PROCEDURE — C1894 INTRO/SHEATH, NON-LASER: HCPCS | Performed by: INTERNAL MEDICINE

## 2024-09-03 PROCEDURE — 25010000002 DIPHENHYDRAMINE PER 50 MG: Performed by: INTERNAL MEDICINE

## 2024-09-03 PROCEDURE — 25510000001 IOPAMIDOL PER 1 ML: Performed by: INTERNAL MEDICINE

## 2024-09-03 PROCEDURE — 25010000002 HEPARIN (PORCINE) PER 1000 UNITS: Performed by: INTERNAL MEDICINE

## 2024-09-03 PROCEDURE — 80053 COMPREHEN METABOLIC PANEL: CPT | Performed by: PHYSICIAN ASSISTANT

## 2024-09-03 PROCEDURE — 82565 ASSAY OF CREATININE: CPT

## 2024-09-03 PROCEDURE — 93458 L HRT ARTERY/VENTRICLE ANGIO: CPT | Performed by: INTERNAL MEDICINE

## 2024-09-03 PROCEDURE — 85027 COMPLETE CBC AUTOMATED: CPT | Performed by: PHYSICIAN ASSISTANT

## 2024-09-03 PROCEDURE — 36415 COLL VENOUS BLD VENIPUNCTURE: CPT

## 2024-09-03 PROCEDURE — 25810000003 SODIUM CHLORIDE 0.9 % SOLUTION: Performed by: PHYSICIAN ASSISTANT

## 2024-09-03 RX ORDER — DIPHENHYDRAMINE HYDROCHLORIDE 50 MG/ML
INJECTION INTRAMUSCULAR; INTRAVENOUS
Status: DISCONTINUED | OUTPATIENT
Start: 2024-09-03 | End: 2024-09-03 | Stop reason: HOSPADM

## 2024-09-03 RX ORDER — NICARDIPINE HCL-0.9% SOD CHLOR 1 MG/10 ML
SYRINGE (ML) INTRAVENOUS
Status: DISCONTINUED | OUTPATIENT
Start: 2024-09-03 | End: 2024-09-03 | Stop reason: HOSPADM

## 2024-09-03 RX ORDER — HEPARIN SODIUM 1000 [USP'U]/ML
INJECTION, SOLUTION INTRAVENOUS; SUBCUTANEOUS
Status: DISCONTINUED | OUTPATIENT
Start: 2024-09-03 | End: 2024-09-03 | Stop reason: HOSPADM

## 2024-09-03 RX ORDER — NITROGLYCERIN 0.4 MG/1
0.4 TABLET SUBLINGUAL
Status: DISCONTINUED | OUTPATIENT
Start: 2024-09-03 | End: 2024-09-03 | Stop reason: HOSPADM

## 2024-09-03 RX ORDER — ASPIRIN 81 MG/1
324 TABLET, CHEWABLE ORAL ONCE
Status: COMPLETED | OUTPATIENT
Start: 2024-09-03 | End: 2024-09-03

## 2024-09-03 RX ORDER — SODIUM CHLORIDE 0.9 % (FLUSH) 0.9 %
10 SYRINGE (ML) INJECTION AS NEEDED
Status: DISCONTINUED | OUTPATIENT
Start: 2024-09-03 | End: 2024-09-03 | Stop reason: HOSPADM

## 2024-09-03 RX ORDER — SODIUM CHLORIDE 9 MG/ML
1 INJECTION, SOLUTION INTRAVENOUS CONTINUOUS
Status: ACTIVE | OUTPATIENT
Start: 2024-09-03 | End: 2024-09-03

## 2024-09-03 RX ORDER — IOPAMIDOL 755 MG/ML
INJECTION, SOLUTION INTRAVASCULAR
Status: DISCONTINUED | OUTPATIENT
Start: 2024-09-03 | End: 2024-09-03 | Stop reason: HOSPADM

## 2024-09-03 RX ORDER — SODIUM CHLORIDE 9 MG/ML
40 INJECTION, SOLUTION INTRAVENOUS AS NEEDED
Status: DISCONTINUED | OUTPATIENT
Start: 2024-09-03 | End: 2024-09-03 | Stop reason: HOSPADM

## 2024-09-03 RX ORDER — ACETAMINOPHEN 325 MG/1
650 TABLET ORAL EVERY 4 HOURS PRN
Status: DISCONTINUED | OUTPATIENT
Start: 2024-09-03 | End: 2024-09-03 | Stop reason: HOSPADM

## 2024-09-03 RX ORDER — FENTANYL CITRATE 50 UG/ML
INJECTION, SOLUTION INTRAMUSCULAR; INTRAVENOUS
Status: DISCONTINUED | OUTPATIENT
Start: 2024-09-03 | End: 2024-09-03 | Stop reason: HOSPADM

## 2024-09-03 RX ORDER — ASPIRIN 81 MG/1
81 TABLET ORAL DAILY
Status: DISCONTINUED | OUTPATIENT
Start: 2024-09-04 | End: 2024-09-03 | Stop reason: HOSPADM

## 2024-09-03 RX ORDER — LIDOCAINE HYDROCHLORIDE 10 MG/ML
INJECTION, SOLUTION EPIDURAL; INFILTRATION; INTRACAUDAL; PERINEURAL
Status: DISCONTINUED | OUTPATIENT
Start: 2024-09-03 | End: 2024-09-03 | Stop reason: HOSPADM

## 2024-09-03 RX ORDER — SODIUM CHLORIDE 0.9 % (FLUSH) 0.9 %
10 SYRINGE (ML) INJECTION EVERY 12 HOURS SCHEDULED
Status: DISCONTINUED | OUTPATIENT
Start: 2024-09-03 | End: 2024-09-03 | Stop reason: HOSPADM

## 2024-09-03 RX ORDER — MIDAZOLAM HYDROCHLORIDE 1 MG/ML
INJECTION INTRAMUSCULAR; INTRAVENOUS
Status: DISCONTINUED | OUTPATIENT
Start: 2024-09-03 | End: 2024-09-03 | Stop reason: HOSPADM

## 2024-09-03 RX ADMIN — ASPIRIN 81 MG 324 MG: 81 TABLET ORAL at 07:28

## 2024-09-03 RX ADMIN — SODIUM CHLORIDE 212.4 ML: 9 INJECTION, SOLUTION INTRAVENOUS at 07:30

## 2024-09-03 NOTE — INTERVAL H&P NOTE
H&P reviewed. The patient was examined and there are no changes to the H&P.      The risks, benefits, and alternative options of cardiac catheterization were discussed with the patient.  The risks include death, MI, stroke, infection, vascular injury requiring surgical repair and/or blood transfusion, coronary dissection, renal dysfunction/failure, allergic reaction, emergent CABG.  If PCI is needed there is a 1-2% risk of emergent CABG.  The patient is agreeable for cardiac catheterization, possible PCI or CABG. Plan is to proceed with cardiac catheterization and possible PCI.     Renny Shaw MD, FACC, [facility]  Interventional Cardiology  09/03/24  07:52 EDT

## 2024-11-12 ENCOUNTER — OFFICE VISIT (OUTPATIENT)
Dept: CARDIOLOGY | Facility: CLINIC | Age: 49
End: 2024-11-12
Payer: COMMERCIAL

## 2024-11-12 VITALS
HEIGHT: 63 IN | BODY MASS INDEX: 29.41 KG/M2 | SYSTOLIC BLOOD PRESSURE: 132 MMHG | OXYGEN SATURATION: 95 % | RESPIRATION RATE: 14 BRPM | WEIGHT: 166 LBS | HEART RATE: 70 BPM | DIASTOLIC BLOOD PRESSURE: 82 MMHG

## 2024-11-12 DIAGNOSIS — R07.9 CHEST PAIN, UNSPECIFIED TYPE: Primary | ICD-10-CM

## 2024-11-12 DIAGNOSIS — I31.39 PERICARDIAL EFFUSION: ICD-10-CM

## 2024-11-12 DIAGNOSIS — E78.00 HYPERCHOLESTEROLEMIA: ICD-10-CM

## 2024-11-12 DIAGNOSIS — I1A.0 RESISTANT HYPERTENSION: ICD-10-CM

## 2024-11-12 PROCEDURE — 99214 OFFICE O/P EST MOD 30 MIN: CPT | Performed by: INTERNAL MEDICINE

## 2024-11-12 PROCEDURE — 1160F RVW MEDS BY RX/DR IN RCRD: CPT | Performed by: INTERNAL MEDICINE

## 2024-11-12 PROCEDURE — 3079F DIAST BP 80-89 MM HG: CPT | Performed by: INTERNAL MEDICINE

## 2024-11-12 PROCEDURE — 1159F MED LIST DOCD IN RCRD: CPT | Performed by: INTERNAL MEDICINE

## 2024-11-12 PROCEDURE — 3075F SYST BP GE 130 - 139MM HG: CPT | Performed by: INTERNAL MEDICINE

## 2024-11-12 RX ORDER — LEVOCETIRIZINE DIHYDROCHLORIDE 5 MG/1
5 TABLET, FILM COATED ORAL EVERY EVENING
COMMUNITY
Start: 2024-10-25

## 2024-11-12 NOTE — ASSESSMENT & PLAN NOTE
Chest wall tenderness on past visits.  Risk factors: Hypertension, hyperlipidemia, smoking.  EKG with ischemic changes.  Abnormal stress test, normal cardiac catheterization.  Echo normal EF, grade 1 diastolic dysfunction and no significant regional wall motion abnormalities limited by suboptimal windows.  No recurrence of chest pain on optimal medical therapy.  Smoking quarter pack per day now.  Plan:  Continue with aggressive blood pressure control  Smoking cessation reinforced  Avoidance of alcohol as discussed, with a slow wean of processed  Monitor home blood pressure

## 2024-11-12 NOTE — ASSESSMENT & PLAN NOTE
Lipid abnormalities are newly identified    Plan:  Continue same medication/s without change.  Rosuvastatin 5 mg p.o. daily    Counseled patient on lifestyle modifications to help control hyperlipidemia.   Advised patient to exercise for 150 minutes weekly. (30 minute brisk walk, 5 days a week for example)  Weight Loss encouraged    Lab Results   Component Value Date    CHOL 190 09/03/2024    TRIG 168 (H) 09/03/2024    HDL 43 09/03/2024     (H) 09/03/2024     Goal of therapy: LDL  mg/dL, normal recent cardiac catheterization      Followup in 6 months.

## 2024-11-12 NOTE — PROGRESS NOTES
"[facility]  [facility] CARDIOLOGY  [address] [place] [address]  Dept: [phone]  Dept Fax: [phone]    Date: 11/12/2024  Patient: Jonna Lazar  YOB: 1975    Follow Up Office Visit Note    Interval Follow-up  Ms. Jonna Lazar is a 49 y.o. female who is here for follow-up on Chest Pain and Hypertension.    Subjective   Patient doing well with no complaints.  Patient denies angina, orthopnea, PND, palpitations, lightheadedness, syncope or medications side-effects.      The following portions of the patient's history were reviewed and updated as appropriate: allergies, current medications, past family history, past medical history, past social history, past surgical history, and problem list.    Medications: No Known Allergies   Current Outpatient Medications   Medication Instructions    acetaminophen (TYLENOL) 650 mg, Every 6 Hours PRN    amLODIPine (NORVASC) 5 mg, Oral, Daily    calcium carbonate (TUMS) 500 mg    ibuprofen (ADVIL,MOTRIN) 400 mg, Every 6 Hours PRN    levocetirizine (XYZAL) 5 mg, Every Evening    metoprolol succinate XL (TOPROL-XL) 50 mg, Oral, Daily    olmesartan (BENICAR) 20 mg, Oral, Daily    omeprazole (PRILOSEC) 20 mg    ondansetron ODT (ZOFRAN-ODT) 4 mg, Oral, Every 8 Hours PRN    promethazine (PHENERGAN) 25 MG tablet Take 1 tablet by mouth Every 8 (Eight) Hours As Needed for Nausea or Vomiting.    QUEtiapine (SEROQUEL) 50 mg, 3 Times Daily PRN    rosuvastatin (CRESTOR) 5 MG tablet Take 1 tablet by mouth Every Night.       Tobacco Use: High Risk (11/12/2024)    Patient History     Smoking Tobacco Use: Every Day     Smokeless Tobacco Use: Never     Passive Exposure: Current        Objective  Vitals:    11/12/24 1327   BP: 132/82   BP Location: Right arm   Patient Position: Sitting   Cuff Size: Adult   Pulse: 70   Resp: 14   SpO2: 95%   Weight: 75.3 kg (166 lb)   Height: 160 cm (63\")   PainSc: 0-No pain      Vitals:    " "11/12/24 1327   BP: 132/82   BP Location: Right arm   Patient Position: Sitting   Cuff Size: Adult   Pulse: 70   Resp: 14   SpO2: 95%   Weight: 75.3 kg (166 lb)   Height: 160 cm (63\")          Physical Exam  Constitutional:       Appearance: Not in distress.   Neck:      Vascular: JVD normal.   Pulmonary:      Breath sounds: Normal breath sounds.   Cardiovascular:      Normal rate. Regular rhythm.      Murmurs: There is no murmur.   Pulses:     Intact distal pulses.   Edema:     Peripheral edema absent.   Neurological:      Mental Status: Alert.              Diagnostic Data  Lab Results   Component Value Date     09/03/2024    K 4.0 09/03/2024     09/03/2024    CO2 24.0 09/03/2024    MG 1.8 (L) 04/04/2022    BUN 20 09/03/2024    CREATININE 0.90 09/03/2024    CALCIUM 9.5 09/03/2024    BILITOT 0.6 09/03/2024    ALKPHOS 78 09/03/2024    ALT 18 09/03/2024    AST 17 09/03/2024    GLUCOSE 92 09/03/2024    ALBUMIN 4.5 09/03/2024     Lab Results   Component Value Date    WBC 11.85 (H) 09/03/2024    HGB 13.3 09/03/2024    HCT 40.0 09/03/2024     09/03/2024     Lab Results   Component Value Date    INR 0.9 04/03/2022     Lab Results   Component Value Date    TROPONINT 13 04/03/2022    TROPONINT 2 04/03/2022    TROPONINT 15 (H) 04/03/2022     Lab Results   Component Value Date    PROBNP 225.5 01/21/2020       Lab Results   Component Value Date    TRIG 168 (H) 09/03/2024    HDL 43 09/03/2024     (H) 09/03/2024     No results found for: \"TSH\", \"FREET4\"    CV Diagnostics:  Procedures    CXR: No results found for this or any previous visit.     ECHO/MUGA: Results for orders placed in visit on 06/07/24    Adult Transthoracic Echo Complete W/ Cont if Necessary Per Protocol    Interpretation Summary    Left ventricular systolic function is normal. Estimated left ventricular EF = 60% Left ventricular ejection fraction appears to be 56 - 60%.    Mild mitral valve regurgitation.    Left ventricular diastolic " function is consistent with (grade I) impaired relaxation.     STRESS TESTS: Results for orders placed in visit on 08/19/24    Stress Test With Myocardial Perfusion One Day    Interpretation Summary    Impressions are consistent with an abnormal/intermediate risk Lexiscan nuclear stress study.    Left ventricular ejection fraction is mildly reduced (Calculated EF = 45%).    There was significant GI uptake and anterior wall attenuation artifacts.  Nonetheless, myocardial perfusion imaging indicates a medium size area of infarction in the apical inferior, apical lateral, apical septal, mid inferolateral segments of the left ventricle, with pippa-infarct ischemia, associated with moderate hypokinesis of the forementioned segments.    Abnormal LV wall motion consistent with moderate hypokinesis of the apical, apical inferior, apical septal and mid inferolateral segments.    Findings consistent with an indeterminate Lexiscan nuclear stress ECG, due to baseline nonspecific ST-T changes with borderline worsening with stress, nonetheless less than 0.5 mm horizontal ST depression, returning to baseline at rest.     CARDIAC CATH: Results for orders placed during the hospital encounter of 09/03/24    Cardiac Catheterization/Vascular Study    Conclusion    Normal coronary arteries.    LV pressures (S/D/E) : 110/-9/9 mmHg     DEVICES: No valid procedures specified.   HOLTER: No results found for this or any previous visit.     CT/MRI:  Results for orders placed in visit on 03/10/20    CT Angiogram Chest With & Without Contrast    Narrative  EXAMINATION: CT ANGIOGRAM CHEST W WO CONTRAST- 04/07/2020    INDICATION: Pulmonary AVM suspected    TECHNIQUE: CT angiogram chest with and without intravenous contrast; 2-D  and 3-D reconstructions performed.    The radiation dose reduction device was turned on for each scan per the  ALARA (As Low as Reasonably Achievable) protocol.    COMPARISON: CT outside dated 02/18/2020    FINDINGS:  Thyroid is homogeneous in attenuation. No bulky mediastinal  adenopathy. Central airways are patent. Esophagus in normal course and  caliber. Patent nonaneurysmal thoracic aorta with patent great vessel  origins. Central pulmonary arteries are grossly patent. Early venous  phase imaging demonstrates draining vein of likely pulmonary AVM left  upper lobe measuring 1.6 cm unchanged from prior in overall appearance  with draining vein seen on series 6 image 37 and 38 for example. A  separate site of likely smaller but vascular origin pulmonary AVM noted  just left of midline anterior left upper lobe measuring 1 cm  additionally noted seen series 6 image 40. No dominant nodule or mass is  otherwise identified. No effusion. No anomalous pulmonary venous return  clearly evident. Descending thoracic aorta unremarkable. Visualized  portions of the upper abdomen demonstrate right adrenal fat density 4 cm  myelolipoma.    Impression  1. There are 2 separate areas within the left upper lobe with a dominant  focus 1.6 cm unchanged from 02/18/2020 comparison examination with  direct connection of early venous draining concerning for pulmonary AVM  with a separate smaller but similar-appearing focus of vascular  involvement and serpiginous venous connection within the more anterior  and near midline left upper lobe 1 cm maximum dimension concerning for  additional smaller pulmonary AVM.    2. Right adrenal myelolipoma.    D:  04/07/2020  E:  04/07/2020    This report was finalized on 4/7/2020 2:16 PM by Dr. Nick Crane.    VASCULAR: No valid procedures specified.     Assessment and Plan  Diagnoses and all orders for this visit:    1. Chest pain, unspecified type (Primary)  Assessment & Plan:  Chest wall tenderness on past visits.  Risk factors: Hypertension, hyperlipidemia, smoking.  EKG with ischemic changes.  Abnormal stress test, normal cardiac catheterization.  Echo normal EF, grade 1 diastolic dysfunction and no significant  regional wall motion abnormalities limited by suboptimal windows.  No recurrence of chest pain on optimal medical therapy.  Smoking quarter pack per day now.  Plan:  Continue with aggressive blood pressure control  Smoking cessation reinforced  Avoidance of alcohol as discussed, with a slow wean of processed  Monitor home blood pressure        2. Resistant hypertension  Assessment & Plan:  Hypertension is stable and controlled  Continue current treatment regimen.  Dietary sodium restriction.  Weight loss.  Regular aerobic exercise.  Ambulatory blood pressure monitoring.  Smoking cessation.  Blood pressure will be reassessed in 6 months.      3. Pericardial effusion  Assessment & Plan:  Last transthoracic echocardiogram negative for pericardial disease.  Continue routine follow-up.      4. Hypercholesterolemia  Assessment & Plan:   Lipid abnormalities are newly identified    Plan:  Continue same medication/s without change.  Rosuvastatin 5 mg p.o. daily    Counseled patient on lifestyle modifications to help control hyperlipidemia.   Advised patient to exercise for 150 minutes weekly. (30 minute brisk walk, 5 days a week for example)  Weight Loss encouraged    Lab Results   Component Value Date    CHOL 190 09/03/2024    TRIG 168 (H) 09/03/2024    HDL 43 09/03/2024     (H) 09/03/2024     Goal of therapy: LDL  mg/dL, normal recent cardiac catheterization      Followup in 6 months.           Return in about 6 months (around 5/12/2025) for Follow-up with Dr Mendez.    There are no Patient Instructions on file for this visit.    Russel Mendez MD

## 2024-11-12 NOTE — ASSESSMENT & PLAN NOTE
Hypertension is stable and controlled  Continue current treatment regimen.  Dietary sodium restriction.  Weight loss.  Regular aerobic exercise.  Ambulatory blood pressure monitoring.  Smoking cessation.  Blood pressure will be reassessed in 6 months.

## (undated) DEVICE — GW PERIPH GUIDERIGHT STD/EXCHNG/J/TIP SS 0.035IN 5X260CM

## (undated) DEVICE — CATH DIAG EXPO .045 FL3  5F 100CM

## (undated) DEVICE — ADULT, W/LG. BACK PAD, RADIOTRANSPARENT ELEMENT AND LEAD WIRE COMPATIBLE W/: Brand: DEFIBRILLATION ELECTRODES

## (undated) DEVICE — MODEL BT2000 P/N 700287-012KIT CONTENTS: MANIFOLD WITH SALINE AND CONTRAST PORTS, SALINE TUBING WITH SPIKE AND HAND SYRINGE, TRANSDUCER: Brand: BT2000 AUTOMATED MANIFOLD KIT

## (undated) DEVICE — GLIDESHEATH SLENDER STAINLESS STEEL KIT: Brand: GLIDESHEATH SLENDER

## (undated) DEVICE — CATH DIAG EXPO M/ PK 5F FL4/FR4 PIG

## (undated) DEVICE — MODEL AT P65, P/N 701554-001KIT CONTENTS: HAND CONTROLLER, 3-WAY HIGH-PRESSURE STOPCOCK WITH ROTATING END AND PREMIUM HIGH-PRESSURE TUBING: Brand: ANGIOTOUCH® KIT

## (undated) DEVICE — TR BAND RADIAL ARTERY COMPRESSION DEVICE: Brand: TR BAND

## (undated) DEVICE — CVR PROB ULTRASND/TRANSD W/GEL 7X11IN STRL

## (undated) DEVICE — PK CATH CARD 10